# Patient Record
Sex: FEMALE | Race: WHITE | Employment: OTHER | URBAN - METROPOLITAN AREA
[De-identification: names, ages, dates, MRNs, and addresses within clinical notes are randomized per-mention and may not be internally consistent; named-entity substitution may affect disease eponyms.]

---

## 2017-04-06 ENCOUNTER — HOSPITAL ENCOUNTER (EMERGENCY)
Age: 72
Discharge: HOME | End: 2017-04-06
Attending: EMERGENCY MEDICINE | Admitting: EMERGENCY MEDICINE
Payer: MEDICARE

## 2017-04-06 ENCOUNTER — APPOINTMENT (OUTPATIENT)
Dept: GENERAL RADIOLOGY | Age: 72
End: 2017-04-06
Attending: PHYSICIAN ASSISTANT
Payer: MEDICARE

## 2017-04-06 VITALS
DIASTOLIC BLOOD PRESSURE: 60 MMHG | OXYGEN SATURATION: 98 % | TEMPERATURE: 98 F | RESPIRATION RATE: 18 BRPM | HEIGHT: 66 IN | HEART RATE: 69 BPM | BODY MASS INDEX: 33.75 KG/M2 | WEIGHT: 210 LBS | SYSTOLIC BLOOD PRESSURE: 127 MMHG

## 2017-04-06 DIAGNOSIS — S80.00XA CONTUSION OF KNEE, UNSPECIFIED LATERALITY, INITIAL ENCOUNTER: Primary | ICD-10-CM

## 2017-04-06 PROCEDURE — 74011250637 HC RX REV CODE- 250/637: Performed by: PHYSICIAN ASSISTANT

## 2017-04-06 PROCEDURE — 73562 X-RAY EXAM OF KNEE 3: CPT

## 2017-04-06 PROCEDURE — 99283 EMERGENCY DEPT VISIT LOW MDM: CPT

## 2017-04-06 RX ORDER — HYDROCODONE BITARTRATE AND ACETAMINOPHEN 7.5; 325 MG/15ML; MG/15ML
2.5 SOLUTION ORAL ONCE
Status: COMPLETED | OUTPATIENT
Start: 2017-04-06 | End: 2017-04-06

## 2017-04-06 RX ORDER — MELOXICAM 7.5 MG/1
7.5 TABLET ORAL DAILY
COMMUNITY

## 2017-04-06 RX ORDER — HYDROCODONE BITARTRATE AND ACETAMINOPHEN 5; 325 MG/1; MG/1
1 TABLET ORAL
Qty: 8 TAB | Refills: 0 | Status: ON HOLD | OUTPATIENT
Start: 2017-04-06 | End: 2018-01-30

## 2017-04-06 RX ADMIN — HYDROCODONE BITARTRATE AND ACETAMINOPHEN 2.5 MG: 2.5; 108 SOLUTION ORAL at 14:08

## 2017-04-06 RX ADMIN — HYDROCODONE BITARTRATE AND ACETAMINOPHEN 2.5 MG: 2.5; 108 SOLUTION ORAL at 15:01

## 2017-04-06 NOTE — ED NOTES
The pt is provided with a walker and instructed on it's use. The pt verbalizes and demonstrates proper use of the walker. The pt is given discharge instructions with one prescription by [facility].

## 2017-04-06 NOTE — DISCHARGE INSTRUCTIONS
We hope that we have addressed all of your medical concerns. The examination and treatment you received in the Emergency Department were for an emergent problem and were not intended as complete care. It is important that you follow up with your healthcare provider(s) for ongoing care. If your symptoms worsen or do not improve as expected, and you are unable to reach your usual health care provider(s), you should return to the Emergency Department. Today's healthcare is undergoing tremendous change, and patient satisfaction surveys are one of the many tools to assess the quality of medical care. You may receive a survey from the [employer] regarding your experience in the Emergency Department. I hope that your experience has been completely positive, particularly the medical care that I provided. As such, please participate in the survey; anything less than excellent does not meet my expectations or intentions. [facility] and [facility] participate in nationally recognized quality of care measures. If your blood pressure is greater than 120/80, as reported below, we urge that you seek medical care to address the potential of high blood pressure, commonly known as hypertension. Hypertension can be hereditary or can be caused by certain medical conditions, pain, stress, or \"white coat syndrome. \"       Please make an appointment with your health care provider(s) for follow up of your Emergency Department visit. VITALS:   Patient Vitals for the past 8 hrs:   Temp Pulse Resp BP SpO2   04/06/17 1443 - 69 18 127/60 98 %   04/06/17 1342 98 °F (36.7 °C) 73 20 131/61 97 %          Thank you for allowing us to provide you with medical care today. We realize that you have many choices for your emergency care needs. Please choose us in the future for any continued health care needs. [facility], [address] [facility] Emergency [place]: [phone]            No results found for this or any previous visit (from the past 24 hour(s)). Xr Knee Rt 3 V    Result Date: 4/6/2017  INDICATION: tib plateau v patella fx Exam: AP, lateral, oblique views of the right knee. FINDINGS: There is no acute fracture-dislocation. There is mild narrowing of the medial and lateral tibia-femoral joint spaces. There are tricompartmental osteophytes. The osseous structures are diffusely demineralized. IMPRESSION: No acute fracture or dislocation. Contusion: Care Instructions  Your Care Instructions  Contusion is the medical term for a bruise. It is the result of a direct blow or an impact, such as a fall. Contusions are common sports injuries. Most people think of a bruise as a black-and-blue spot. This happens when small blood vessels get torn and leak blood under the skin. But bones, muscles, and organs can also get bruised. This may damage deep tissues but not cause a bruise you can see. The doctor will do a physical exam to find the location of your contusion. You may also have tests to make sure you do not have a more serious injury, such as a broken bone or nerve damage. These may include X-rays or other imaging tests like a CT scan or MRI. Deep-tissue contusions may cause pain and swelling. But if there is no serious damage, they will often get better in a few weeks with home treatment. The doctor has checked you carefully, but problems can develop later. If you notice any problems or new symptoms, get medical treatment right away. Follow-up care is a key part of your treatment and safety. Be sure to make and go to all appointments, and call your doctor if you are having problems. It's also a good idea to know your test results and keep a list of the medicines you take. How can you care for yourself at home? · Put ice or a cold pack on the sore area for 10 to 20 minutes at a time to stop swelling.  Put a thin cloth between the ice pack and your skin. · Be safe with medicines. Read and follow all instructions on the label. ¨ If the doctor gave you a prescription medicine for pain, take it as prescribed. ¨ If you are not taking a prescription pain medicine, ask your doctor if you can take an over-the-counter medicine. · If you can, prop up the sore area on pillows as much as possible for the next few days. Try to keep the sore area above the level of your heart. When should you call for help? Call your doctor now or seek immediate medical care if:  · Your pain gets worse. · You have new or worse swelling. · You have tingling, weakness, or numbness in the area near the contusion. · The area near the contusion is cold or pale. Watch closely for changes in your health, and be sure to contact your doctor if:  · You do not get better as expected. Where can you learn more? Go to [employer]  Enter [id number] in the search box to learn more about \"Contusion: Care Instructions. \"   © 8336-2794 Healthwise, Incorporated. Care instructions adapted under license by [employer] (which disclaims liability or warranty for this information). This care instruction is for use with your licensed healthcare professional. If you have questions about a medical condition or this instruction, always ask your healthcare professional. [address] any warranty or liability for your use of this information.   Content Version: 85.2.810773; Current as of: May 22, 2015

## 2017-04-06 NOTE — ED TRIAGE NOTES
Pt was wheeled to the treatment area. Pt states \"I tripped and fell outside this morning about 745 am landing on my right knee. Now I can not bear any weight without pain and it seems to be getting swollen. \"

## 2017-04-06 NOTE — ED PROVIDER NOTES
HPI Comments: 72 y/o F presents following slip and fall landing on R knee, just pta, pain 10/10, difficulty walking due to pain, drove self here, no hip or upper/lower leg pain, no head injury denies syncope. Patient notes pain only at knee. Able to flex/straighten knee without limitation. Patient is a 73 y.o. female presenting with knee pain. Knee Pain           Past Medical History:   Diagnosis Date    Cardiac rhythm disorder or disturbance or change     Left upper quadrant pain 2013    Mitral valve prolapse        Past Surgical History:   Procedure Laterality Date    HX GASTRIC BYPASS  7585,0841    HX HEART CATHETERIZATION      HX PARTIAL HYSTERECTOMY           Family History:   Problem Relation Age of Onset    Cancer Mother     Heart Disease Mother     Hypertension Mother     Cancer Father      leukemia    Cancer Sister      -breast    Thyroid Disease Sister      precancer       Social History     Social History    Marital status:      Spouse name: N/A    Number of children: N/A    Years of education: N/A     Occupational History    Not on file. Social History Main Topics    Smoking status: Former Smoker    Smokeless tobacco: Never Used    Alcohol use Yes      Comment: occassional    Drug use: No    Sexual activity: Not on file     Other Topics Concern    Not on file     Social History Narrative         ALLERGIES: Azithromycin; Codeine; and Morphine    Review of Systems   Musculoskeletal: Positive for gait problem. All other systems reviewed and are negative. Vitals:    17 1342 17 1443   BP: 131/61 127/60   Pulse: 73 69   Resp: 20 18   Temp: 98 °F (36.7 °C)    SpO2: 97% 98%   Weight: 95.3 kg (210 lb)    Height: 5' 5.5\" (1.664 m)             Physical Exam   Constitutional: She is oriented to person, place, and time. She appears well-developed and well-nourished. HENT:   Head: Normocephalic and atraumatic.    Eyes: Conjunctivae and EOM are normal. Pupils are equal, round, and reactive to light. Neck: Normal range of motion. Neck supple. Cardiovascular: Normal rate and normal heart sounds. Pulmonary/Chest: Effort normal and breath sounds normal.   Abdominal: Soft. Bowel sounds are normal.   Musculoskeletal:        Right hip: Normal.        Right knee: She exhibits bony tenderness. She exhibits normal range of motion, no swelling, no deformity and normal patellar mobility. Tenderness found. Medial joint line and lateral joint line tenderness noted. Right upper leg: Normal.        Right foot: Normal.   Neurological: She is alert and oriented to person, place, and time. Skin: Skin is warm and dry. Nursing note and vitals reviewed. MDM  Number of Diagnoses or Management Options  Contusion of knee, unspecified laterality, initial encounter:   Diagnosis management comments: R knee pain, nonspecific, able to bear weight, patient has a walker with her, ambulating safely, will provide a couple of days of analgesics with f/u prn. Do not suspect occult fx or ligamentous injury.      ED Course       Procedures

## 2017-05-30 ENCOUNTER — HOSPITAL ENCOUNTER (OUTPATIENT)
Dept: GENERAL RADIOLOGY | Age: 72
Discharge: HOME | End: 2017-05-30
Attending: FAMILY MEDICINE
Payer: MEDICARE

## 2017-05-30 ENCOUNTER — HOSPITAL ENCOUNTER (OUTPATIENT)
Dept: MAMMOGRAPHY | Age: 72
Discharge: HOME | End: 2017-05-30
Attending: FAMILY MEDICINE
Payer: MEDICARE

## 2017-05-30 DIAGNOSIS — M54.2 CERVICALGIA: ICD-10-CM

## 2017-05-30 DIAGNOSIS — Z78.0 POSTMENOPAUSAL: ICD-10-CM

## 2017-05-30 DIAGNOSIS — R20.2 PARESTHESIA: ICD-10-CM

## 2017-05-30 PROCEDURE — 77080 DXA BONE DENSITY AXIAL: CPT

## 2017-05-30 PROCEDURE — 72100 X-RAY EXAM L-S SPINE 2/3 VWS: CPT

## 2017-05-30 PROCEDURE — 72050 X-RAY EXAM NECK SPINE 4/5VWS: CPT

## 2017-06-01 ENCOUNTER — HOSPITAL ENCOUNTER (OUTPATIENT)
Dept: VASCULAR SURGERY | Age: 72
Discharge: HOME | End: 2017-06-01
Attending: FAMILY MEDICINE
Payer: MEDICARE

## 2017-06-01 ENCOUNTER — HOSPITAL ENCOUNTER (OUTPATIENT)
Dept: MAMMOGRAPHY | Age: 72
Discharge: HOME | End: 2017-06-01
Attending: FAMILY MEDICINE
Payer: MEDICARE

## 2017-06-01 DIAGNOSIS — R20.2 PARESTHESIA OF BOTH LEGS: ICD-10-CM

## 2017-06-01 DIAGNOSIS — Z12.31 VISIT FOR SCREENING MAMMOGRAM: ICD-10-CM

## 2017-06-01 PROCEDURE — 93970 EXTREMITY STUDY: CPT

## 2017-06-01 PROCEDURE — 77067 SCR MAMMO BI INCL CAD: CPT

## 2017-06-09 ENCOUNTER — HOSPITAL ENCOUNTER (OUTPATIENT)
Dept: MRI IMAGING | Age: 72
Discharge: HOME | End: 2017-06-09
Attending: ORTHOPAEDIC SURGERY
Payer: MEDICARE

## 2017-06-09 DIAGNOSIS — M25.561 RIGHT KNEE PAIN: ICD-10-CM

## 2017-06-09 PROCEDURE — 73721 MRI JNT OF LWR EXTRE W/O DYE: CPT

## 2017-10-02 ENCOUNTER — HOSPITAL ENCOUNTER (OUTPATIENT)
Dept: CT IMAGING | Age: 72
Discharge: HOME | End: 2017-10-02
Attending: FAMILY MEDICINE
Payer: MEDICARE

## 2017-10-02 DIAGNOSIS — S09.90XA: ICD-10-CM

## 2017-10-02 PROCEDURE — 70450 CT HEAD/BRAIN W/O DYE: CPT

## 2018-01-29 NOTE — H&P
81 y.o. female for open access colonoscopy for screening   Additional data for completion of the targeted pre-endoscopy H&P will be provided under 'H&P interval notes'. Please see that document which will be attached to this.   Marcin Tinsley MD  Last colon TJS - hx polyps 2012

## 2018-01-30 ENCOUNTER — ANESTHESIA EVENT (OUTPATIENT)
Dept: ENDOSCOPY | Age: 73
End: 2018-01-30
Payer: MEDICARE

## 2018-01-30 ENCOUNTER — HOSPITAL ENCOUNTER (OUTPATIENT)
Age: 73
Setting detail: OUTPATIENT SURGERY
Discharge: HOME | End: 2018-01-30
Attending: SPECIALIST | Admitting: SPECIALIST
Payer: MEDICARE

## 2018-01-30 ENCOUNTER — ANESTHESIA (OUTPATIENT)
Dept: ENDOSCOPY | Age: 73
End: 2018-01-30
Payer: MEDICARE

## 2018-01-30 VITALS
HEART RATE: 84 BPM | TEMPERATURE: 98.7 F | OXYGEN SATURATION: 99 % | SYSTOLIC BLOOD PRESSURE: 130 MMHG | DIASTOLIC BLOOD PRESSURE: 67 MMHG | BODY MASS INDEX: 33.75 KG/M2 | HEIGHT: 66 IN | RESPIRATION RATE: 20 BRPM | WEIGHT: 210 LBS

## 2018-01-30 PROCEDURE — 76040000019: Performed by: SPECIALIST

## 2018-01-30 PROCEDURE — 88305 TISSUE EXAM BY PATHOLOGIST: CPT | Performed by: SPECIALIST

## 2018-01-30 PROCEDURE — 77030013992 HC SNR POLYP ENDOSC BSC -B: Performed by: SPECIALIST

## 2018-01-30 PROCEDURE — 74011250636 HC RX REV CODE- 250/636: Performed by: SPECIALIST

## 2018-01-30 RX ORDER — ALBUTEROL SULFATE 90 UG/1
2 AEROSOL, METERED RESPIRATORY (INHALATION)
COMMUNITY

## 2018-01-30 RX ORDER — DEXTROMETHORPHAN/PSEUDOEPHED 2.5-7.5/.8
1.2 DROPS ORAL
Status: DISCONTINUED | OUTPATIENT
Start: 2018-01-30 | End: 2018-01-30 | Stop reason: HOSPADM

## 2018-01-30 RX ORDER — NALOXONE HYDROCHLORIDE 0.4 MG/ML
0.4 INJECTION, SOLUTION INTRAMUSCULAR; INTRAVENOUS; SUBCUTANEOUS
Status: DISCONTINUED | OUTPATIENT
Start: 2018-01-30 | End: 2018-01-30 | Stop reason: HOSPADM

## 2018-01-30 RX ORDER — FENTANYL CITRATE 50 UG/ML
25 INJECTION, SOLUTION INTRAMUSCULAR; INTRAVENOUS AS NEEDED
Status: DISCONTINUED | OUTPATIENT
Start: 2018-01-30 | End: 2018-01-30 | Stop reason: HOSPADM

## 2018-01-30 RX ORDER — FLUMAZENIL 0.1 MG/ML
0.2 INJECTION INTRAVENOUS
Status: DISCONTINUED | OUTPATIENT
Start: 2018-01-30 | End: 2018-01-30 | Stop reason: HOSPADM

## 2018-01-30 RX ORDER — SODIUM CHLORIDE 9 MG/ML
50 INJECTION, SOLUTION INTRAVENOUS CONTINUOUS
Status: DISCONTINUED | OUTPATIENT
Start: 2018-01-30 | End: 2018-01-30 | Stop reason: HOSPADM

## 2018-01-30 RX ORDER — MIDAZOLAM HYDROCHLORIDE 1 MG/ML
.25-5 INJECTION, SOLUTION INTRAMUSCULAR; INTRAVENOUS AS NEEDED
Status: DISCONTINUED | OUTPATIENT
Start: 2018-01-30 | End: 2018-01-30 | Stop reason: HOSPADM

## 2018-01-30 RX ORDER — PREDNISONE 10 MG/1
10 TABLET ORAL SEE ADMIN INSTRUCTIONS
COMMUNITY
End: 2018-04-03 | Stop reason: CLARIF

## 2018-01-30 RX ADMIN — FENTANYL CITRATE 50 MCG: 50 INJECTION, SOLUTION INTRAMUSCULAR; INTRAVENOUS at 10:07

## 2018-01-30 RX ADMIN — SODIUM CHLORIDE 50 ML/HR: 900 INJECTION, SOLUTION INTRAVENOUS at 09:35

## 2018-01-30 RX ADMIN — MIDAZOLAM HYDROCHLORIDE 2 MG: 1 INJECTION, SOLUTION INTRAMUSCULAR; INTRAVENOUS at 10:07

## 2018-01-30 NOTE — PERIOP NOTES
Patient removed all her jewelry and placed in her purse. Which was then zipped closed and placed in patient belonging bag.

## 2018-01-30 NOTE — PROCEDURES
[address] [facility] GOKUL Lopez MD  [phone]      2018    Colonoscopy Procedure Note  Lv Burt  :  1945  Ana Medical Record Number: [id number]    Indications:     Personal history of colon polyps (screening only), Screening colonoscopy  PCP:  Osorio Nam MD  Anesthesia/Sedation: Conscious Sedation/Moderate Sedation  Endoscopist:  Dr. Estephania Alfaro  Complications:  None  Estimated Blood Loss:  None    Permit:  The indications, risks, benefits and alternatives were reviewed with the patient or their decision maker who was provided an opportunity to ask questions and all questions were answered. The specific risks of colonoscopy with conscious sedation were reviewed, including but not limited to anesthetic complication, bleeding, adverse drug reaction, missed lesion, infection, IV site reactions, and intestinal perforation which would lead to the need for surgical repair. Alternatives to colonoscopy including radiographic imaging, observation without testing, or laboratory testing were reviewed including the limitations of those alternatives. After considering the options and having all their questions answered, the patient or their decision maker provided both verbal and written consent to proceed. Procedure in Detail:  After obtaining informed consent, positioning of the patient in the left lateral decubitus position, and conduction of a pre-procedure pause or \"time out\" the endoscope was introduced into the anus and advanced to the cecum, which was identified by the ileocecal valve and appendiceal orifice. The quality of the colonic preparation was good. A careful inspection was made as the colonoscope was withdrawn, findings and interventions are described below.     Findings:   Ascending polyp 5mm  Transverse polyp 5mm  Sigmoid polyps 4mm 6mm  All taken cold snare, submitted in 3 vials based on location, complete resection, retrieval and hemostasis noted. Specimens:    See above    Complications:   None; patient tolerated the procedure well. Impression:  Colon polyps four. Recommendations:     - Await pathology. Thank you for entrusting me with this patient's care. Please do not hesitate to contact me with any questions or if I can be of assistance with any of your other patients' GI needs.     Signed By: Ray Encarnacion MD                        January 30, 2018

## 2018-01-30 NOTE — ANESTHESIA PREPROCEDURE EVALUATION
Anesthetic History               Review of Systems / Medical History  Patient summary reviewed and nursing notes reviewed    Pulmonary                   Neuro/Psych   Within defined limits           Cardiovascular    Hypertension: well controlled              Exercise tolerance: >4 METS  Comments: MVP   GI/Hepatic/Renal                Endo/Other        Obesity     Other Findings              Physical Exam    Airway  Mallampati: III    Neck ROM: normal range of motion   Mouth opening: Normal     Cardiovascular    Rhythm: regular  Rate: normal         Dental    Dentition: Caps/crowns, Full upper dentures and Lower partial plate     Pulmonary  Breath sounds clear to auscultation               Abdominal         Other Findings            Anesthetic Plan    ASA: 2  Anesthesia type: MAC            Anesthetic plan and risks discussed with: Patient      Informed consent obtained.

## 2018-01-30 NOTE — DISCHARGE INSTRUCTIONS
[address] [facility] GOKUL Daly MD  [phone]      January 30, 2018    Alejandro Graham  Birthdate: [birth year]    COLONOSCOPY DISCHARGE INSTRUCTIONS    If there is redness at IV site you should apply warm compress to area. If redness or soreness persist contact Dr. Priti Daly' or your primary care doctor. There may be a slight amount of blood passed from the rectum. Gaseous discomfort may develop, but walking, belching will help relieve this. You may not operate a vehicle for 12 hours  You may not operate machinery or dangerous appliances for rest of today  You may not drink alcoholic beverages for 12 hours  Avoid making any critical decisions for 24 hours    DIET:  You may resume your normal diet, but some patients find that heavy or large meals may lead to indigestion or vomiting. I suggest a light meal as first food intake. MEDICATIONS:  The use of some over-the-counter pain medication may lead to bleeding after colon biopsies or polyp removal.  Tylenol (also called acetaminophen) is safe to take even if you have had colonoscopy with polyp removal.  Based on the procedure you had today you may not safely take aspirin or aspirin-like products for the next ten (10) days. Remember that Tylenol (also called acetaminophen) is safe to take after colonoscopy even if you have had biopsies or polyps removed. ACTIVITY:  You may resume your normal household activities, but it is recommended that you spend the remainder of the day resting -  avoid any strenuous activity.     CALL DR. Melodie Schreiber' OFFICE IF:  Increasing pain, nausea, vomiting  Abdominal distension (swelling)  Significant new or increased bleeding (oral or rectal)  Fever/Chills  Chest pain/shortness of breath                       Additional instructions:   No aspirin 10 days  We found several polyps and removed them all  I will contact you with the polyp results by letter in about a week and give advice about when to have next colonoscopy based on those results. It was an honor to be your doctor today. Please let me or my office staff know if you have any feedback about today's procedure. Bay Layne MD    Colonoscopy saves lives, and can prevent colon cancer. Everyone aged 48 or older needs colonoscopy.   Tell your family and friends: get the test!

## 2018-01-30 NOTE — IP AVS SNAPSHOT
Summary of Care Report The Summary of Care report has been created to help improve care coordination. Users with access to [employer] or [address] (Web-based application) may access additional patient information including the Discharge Summary. If you are not currently a [address] user and need more information, please call the number listed below in the [place] section and ask to be connected with Medical Records. Facility Information Name Address Phone [address] [facility] [place] [facility] [address] [phone] Patient Information Patient Name Sex  Tina Yen ([id number]) Female 1945 Discharge Information Admitting Provider Service Area Unit Estefania Christianson MD / [id number] / [phone] Discharge Provider Discharge Date/Time Discharge Disposition Destination (none) (none) (none) (none) Patient Language Language ENGLISH [13] Hospital Problems as of 2018  Reviewed: 2016  9:26 PM by Jose Crews MD  
 None Non-Hospital Problems as of 2018  Reviewed: 2016  9:26 PM by Jose Crews MD  
  
  
  
 Class Noted - Resolved Last Modified Active Problems Left upper quadrant pain  2013 - Present 2013 by Simuel Schilder, MD  
  Entered by Simuel Schilder, MD  
  Diarrhea  2013 - Present 2013 by Simuel Schilder, MD  
  Entered by Simuel Schilder, MD  
  Weight loss  2013 - Present 2013 by Simuel Schilder, MD  
  Entered by Simuel Schilder, MD  
  Lytic lesion of bone on x-ray  2016 - Present 2016 by Karoline Mathew NP Entered by Karoline Mathew NP Pathologic fracture of sternum  2016 - Present 2016 by Karoline Mathew NP   Entered by Karoline Mathew NP  
  
 You are allergic to the following Allergen Reactions Azithromycin Other (comments) Makes pt feel worse, run fever, get shaky. Codeine Itching Morphine Itching Current Discharge Medication List  
  
CONTINUE these medications which have NOT CHANGED Dose & Instructions Dispensing Information Comments  
 albuterol 90 mcg/actuation inhaler Commonly known as:  PROVENTIL HFA, VENTOLIN HFA, PROAIR HFA Dose:  2 Puff Take 2 Puffs by inhalation every six (6) hours as needed for Wheezing. Refills:  0  
   
 meloxicam 7.5 mg tablet Commonly known as:  MOBIC Dose:  7.5 mg Take 7.5 mg by mouth daily. Refills:  0  
   
 metoprolol tartrate 50 mg tablet Commonly known as:  LOPRESSOR Dose:  50 mg Take 50 mg by mouth daily. Refills:  0  
   
 predniSONE 10 mg dose pack Commonly known as:  STERAPRED DS Dose:  10 mg Take 10 mg by mouth See Admin Instructions. See administration instruction per 10mg dose pack Refills:  0  
   
 triamterene-hydroCHLOROthiazide 37.5-25 mg per capsule Commonly known as:  Deborra [place] Take  by mouth daily. Refills:  0 Surgery Information ID Date/Time Status Primary Surgeon All Procedures Location [id number] 1/30/2018 [address] [place] [address] MD Ananth COLONOSCOPY 
ENDOSCOPIC POLYPECTOMY [facility] ENDOSCOPY Follow-up Information None Discharge Instructions [address] Jennifer Gaston. Evonnie Denver, [address] [place] [address] 
[phone] January 30, 2018 [facility] YOB: 1945 COLONOSCOPY DISCHARGE INSTRUCTIONS If there is redness at IV site you should apply warm compress to area. If redness or soreness persist contact Dr. Evonnie Denver' or your primary care doctor. There may be a slight amount of blood passed from the rectum. Gaseous discomfort may develop, but walking, belching will help relieve this. You may not operate a vehicle for 12 hours You may not operate machinery or dangerous appliances for rest of today You may not drink alcoholic beverages for 12 hours Avoid making any critical decisions for 24 hours DIET: 
You may resume your normal diet, but some patients find that heavy or large meals may lead to indigestion or vomiting. I suggest a light meal as first food intake. MEDICATIONS: 
The use of some over-the-counter pain medication may lead to bleeding after colon biopsies or polyp removal.  Tylenol (also called acetaminophen) is safe to take even if you have had colonoscopy with polyp removal.  Based on the procedure you had today you may not safely take aspirin or aspirin-like products for the next ten (10) days. Remember that Tylenol (also called acetaminophen) is safe to take after colonoscopy even if you have had biopsies or polyps removed. ACTIVITY: 
You may resume your normal household activities, but it is recommended that you spend the remainder of the day resting -  avoid any strenuous activity. CALL DR. Isaura Sheehan' OFFICE IF: Increasing pain, nausea, vomiting Abdominal distension (swelling) Significant new or increased bleeding (oral or rectal) Fever/Chills Chest pain/shortness of breath Additional instructions:  
No aspirin 10 days We found several polyps and removed them all I will contact you with the polyp results by letter in about a week and give advice about when to have next colonoscopy based on those results. It was an honor to be your doctor today. Please let me or my office staff know if you have any feedback about today's procedure. Joylene Holstein, MD 
 
Colonoscopy saves lives, and can prevent colon cancer. Everyone aged 48 or older needs colonoscopy. Tell your family and friends: get the test! 
 
 
 
 
Chart Review Routing History No Routing History on File

## 2018-01-30 NOTE — IP AVS SNAPSHOT
[address] [place] [address] [id number] [facility] 
[phone] Patient: Kimberly Martinez MRN: [id number] About your hospitalization You were admitted on:  January 30, 2018 You last received care in the:  [facility] ENDOSCOPY You were discharged on:  January 30, 2018 Why you were hospitalized Your primary diagnosis was:  Not on File Follow-up Information None Discharge Orders None A check rae indicates which time of day the medication should be taken. My Medications CONTINUE taking these medications Instructions Each Dose to Equal  
 Morning Noon Evening Bedtime  
 albuterol 90 mcg/actuation inhaler Commonly known as:  PROVENTIL HFA, VENTOLIN HFA, PROAIR HFA Your last dose was: Your next dose is: Take 2 Puffs by inhalation every six (6) hours as needed for Wheezing. 2 Puff  
    
   
   
   
  
 meloxicam 7.5 mg tablet Commonly known as:  MOBIC Your last dose was: Your next dose is: Take 7.5 mg by mouth daily. 7.5 mg  
    
   
   
   
  
 metoprolol tartrate 50 mg tablet Commonly known as:  LOPRESSOR Your last dose was: Your next dose is: Take 50 mg by mouth daily. 50 mg  
    
   
   
   
  
 predniSONE 10 mg dose pack Commonly known as:  STERAPRED DS Your last dose was: Your next dose is: Take 10 mg by mouth See Admin Instructions. See administration instruction per 10mg dose pack 10 mg  
    
   
   
   
  
 triamterene-hydroCHLOROthiazide 37.5-25 mg per capsule Commonly known as:  Collette Dais Your last dose was: Your next dose is: Take  by mouth daily. Discharge Instructions [address] [place]  Paresh Lynch MD 
 [phone] January 30, 2018 Maryana Lundberg YOB: 1945 COLONOSCOPY DISCHARGE INSTRUCTIONS If there is redness at IV site you should apply warm compress to area. If redness or soreness persist contact Dr. Ashleigh Mccullough or your primary care doctor. There may be a slight amount of blood passed from the rectum. Gaseous discomfort may develop, but walking, belching will help relieve this. You may not operate a vehicle for 12 hours You may not operate machinery or dangerous appliances for rest of today You may not drink alcoholic beverages for 12 hours Avoid making any critical decisions for 24 hours DIET: 
You may resume your normal diet, but some patients find that heavy or large meals may lead to indigestion or vomiting. I suggest a light meal as first food intake. MEDICATIONS: 
The use of some over-the-counter pain medication may lead to bleeding after colon biopsies or polyp removal.  Tylenol (also called acetaminophen) is safe to take even if you have had colonoscopy with polyp removal.  Based on the procedure you had today you may not safely take aspirin or aspirin-like products for the next ten (10) days. Remember that Tylenol (also called acetaminophen) is safe to take after colonoscopy even if you have had biopsies or polyps removed. ACTIVITY: 
You may resume your normal household activities, but it is recommended that you spend the remainder of the day resting -  avoid any strenuous activity. CALL DR. Eduarda Orta' OFFICE IF: Increasing pain, nausea, vomiting Abdominal distension (swelling) Significant new or increased bleeding (oral or rectal) Fever/Chills Chest pain/shortness of breath Additional instructions:  
No aspirin 10 days We found several polyps and removed them all I will contact you with the polyp results by letter in about a week and give advice about when to have next colonoscopy based on those results. It was an honor to be your doctor today. Please let me or my office staff know if you have any feedback about today's procedure. Emelina Richardson MD 
 
Colonoscopy saves lives, and can prevent colon cancer. Everyone aged 48 or older needs colonoscopy. Tell your family and friends: get the test! 
 
 
 
 
  
  
  
Introducing [employer] [facility] introduces [facility] patient portal. Now you can access parts of your medical record, email your doctor's office, and request medication refills online. 1. In your internet browser, go to [email or web address] [email or web address] 2. Click on the First Time User? Click Here link in the Sign In box. You will see the New Member Sign Up page. 3. Enter your [facility] [employer] [facility] Access Code exactly as it appears below. You will not need to use this code after youve completed the sign-up process. If you do not sign up before the expiration date, you must request a new code. · [facility] [employer] Access Code: [email or web address] Expires: 4/30/2018  8:39 AM 
 
4. Enter the last four digits of your Social Security Number (xxxx) and Date of Birth (mm/dd/yyyy) as indicated and click Submit. You will be taken to the next sign-up page. 5. Create a [facility] [employer] [facility] ID. This will be your [facility] [employer] [facility] login ID and cannot be changed, so think of one that is secure and easy to remember. 6. Create a [facility] [employer] password. You can change your password at any time. 7. Enter your Password Reset Question and Answer. This can be used at a later time if you forget your password. 8. Enter your e-mail address. You will receive e-mail notification when new information is available in [address] 9. Click Sign Up. You can now view and download portions of your medical record. 10. Click the Download Summary menu link to download a portable copy of your medical information. If you have questions, please visit the Frequently Asked Questions section of the [employer] website.  Remember, [facility] [employer] [facility] is NOT to be used for urgent needs. For medical emergencies, dial 911. Now available from your iPhone and Android! Providers Seen During Your Hospitalization Provider Specialty Primary office phone Janina Perdomo MD Gastroenterology [phone] Your Primary Care Physician (PCP) Primary Care Physician Office Phone Office Fax OTHER, PHYS ** None ** ** None ** You are allergic to the following Allergen Reactions Azithromycin Other (comments) Makes pt feel worse, run fever, get shaky. Codeine Itching Morphine Itching Recent Documentation Height Weight Breastfeeding? BMI OB Status Smoking Status 1.664 m 95.3 kg No 34.41 kg/m2 Hysterectomy Former Smoker Emergency Contacts Name Discharge Info Relation Home Work Mobile 71 Swapna Marroquin CAREGIVER [3] Child [2] [phone] Nikos Landaverde-Eryn DISCHARGE CAREGIVER [3] Child [2] [phone] Patient Belongings The following personal items are in your possession at time of discharge: 
  Dental Appliances: Lowers, Partials, Uppers  Visual Aid: None Please provide this summary of care documentation to your next provider. Signatures-by signing, you are acknowledging that this After Visit Summary has been reviewed with you and you have received a copy. Patient Signature:  ____________________________________________________________ Date:  ____________________________________________________________  
  
Yelitza Cuadra Provider Signature:  ____________________________________________________________ Date:  ____________________________________________________________

## 2018-01-30 NOTE — PROGRESS NOTES
Daniel Counce  [birth year]  [id number]    Situation:  Verbal report received from: Sal Sandoval RN  Procedure: Procedure(s):  COLONOSCOPY  ENDOSCOPIC POLYPECTOMY    Background:    Preoperative diagnosis: PERS HX COLONIC POLYPS  Postoperative diagnosis: ascending colon polyp, transverse colon polyp, diverticulosis, sigmoid colon polyp    :  Dr. Bañuelos Record  Assistant(s): Endoscopy Technician-1: Janina St  Endoscopy RN-1: Rosanna Schofield RN    Specimens:   ID Type Source Tests Collected by Time Destination   1 : ascending colon polyp Preservative Colon, Ascending  Alexi Dubois MD 1/30/2018 1014 Pathology   2 : transverse colon polyp Preservative Colon, Transverse  Alexi Dubois MD 1/30/2018 1015 Pathology   3 : sigmoid colon polyps Preservative Sigmoid  Alexi Dubois MD 1/30/2018 1019 Pathology     H. Pylori  no    Assessment:  Intra-procedure medications   Versed 2 mg  Fentanyl 50 mcg  Anesthesia gave intra-procedure sedation and medications, see anesthesia flow sheet no    Intravenous fluids: NS@ KVO     Vital signs stable yes    Abdominal assessment: round and soft yes    Recommendation:  Discharge patient per MD order yes.   Return to floor outpatient  Family or Friend family  Permission to share finding with family or friend yes

## 2018-01-30 NOTE — INTERVAL H&P NOTE
Pre-Endoscopy H&P Update  Chief complaint/HPI/ROS:  The indication for the procedure, the patient's history and the patient's current medications are reviewed prior to the procedure and that data is reported on the H&P to which this document is attached. Any significant complaints with regard to organ systems will be noted, and if not mentioned then a review of systems is not contributory. Past Medical History:   Diagnosis Date    Asthma     Cardiac rhythm disorder or disturbance or change     MVP and heart murmur    Hypertension     Left upper quadrant pain 2013    Mitral valve prolapse       Past Surgical History:   Procedure Laterality Date    HX GASTRIC BYPASS  [id number]    HX GYN      uterine fibroids removed before hysterectomy    HX HEART CATHETERIZATION      negative    HX HYSTERECTOMY      HX OTHER SURGICAL      left middle finger cut nerve and repaired    HX PARTIAL HYSTERECTOMY       Social   Social History   Substance Use Topics    Smoking status: Former Smoker     Packs/day: 1.00     Quit date: 1987    Smokeless tobacco: Never Used    Alcohol use Yes      Comment: occassional      Family History   Problem Relation Age of Onset    Cancer Mother     Heart Disease Mother     Hypertension Mother     Breast Cancer Mother     Cancer Father      leukemia    Cancer Sister      -breast    Breast Cancer Sister     Thyroid Disease Sister      precancer      Allergies   Allergen Reactions    Azithromycin Other (comments)     Makes pt feel worse, run fever, get shaky.  Codeine Itching    Morphine Itching      Prior to Admission Medications   Prescriptions Last Dose Informant Patient Reported? Taking? albuterol (PROVENTIL HFA, VENTOLIN HFA, PROAIR HFA) 90 mcg/actuation inhaler 2017 at Unknown time  Yes Yes   Sig: Take 2 Puffs by inhalation every six (6) hours as needed for Wheezing.    meloxicam (MOBIC) 7.5 mg tablet 2018 at Unknown time  Yes Yes Sig: Take 7.5 mg by mouth daily. metoprolol (LOPRESSOR) 50 mg tablet 1/28/2018 at am  Yes Yes   Sig: Take 50 mg by mouth daily. predniSONE (STERAPRED DS) 10 mg dose pack 1/29/2018 at pm  Yes Yes   Sig: Take 10 mg by mouth See Admin Instructions. See administration instruction per 10mg dose pack   triamterene-hydrochlorothiazide (DYAZIDE) 37.5-25 mg per capsule 1/28/2018 at am  Yes No   Sig: Take  by mouth daily. Facility-Administered Medications: None       PHYSICAL EXAM:  The patient is examined immediately prior to the procedure. Visit Vitals    /71    Pulse 76    Temp 98.3 °F (36.8 °C)    Resp 17    Ht 5' 5.5\" (1.664 m)    Wt 95.3 kg (210 lb)    SpO2 98%    Breastfeeding No    BMI 34.41 kg/m2     Gen: Appears comfortable, no distress. Pulm: comfortable respirations with no abnormal audible breath sounds  CV: heart regular, well perfused  GI: abdomen flat. PLAN:  Informed consent discussion held, patient afforded an opportunity to ask questions and all questions answered. After being advised of the risks, benefits, and alternatives, the patient requested that we proceed and indicated so on a written consent form. Will proceed with procedure as planned.   Michelle Adams MD

## 2018-01-30 NOTE — PERIOP NOTES
Patient tolerated procedure without problems. Abdomen soft and non tender to palpation. Patient arouseable and voices no complaints of pain. Vital signs stable.  Transported to endo post via stretcher and bedside verbal report given to [address] [place] [address]

## 2018-02-10 ENCOUNTER — HOSPITAL ENCOUNTER (EMERGENCY)
Age: 73
Discharge: HOME | End: 2018-02-10
Attending: EMERGENCY MEDICINE
Payer: MEDICARE

## 2018-02-10 VITALS
SYSTOLIC BLOOD PRESSURE: 141 MMHG | DIASTOLIC BLOOD PRESSURE: 68 MMHG | TEMPERATURE: 98 F | HEART RATE: 81 BPM | HEIGHT: 65 IN | OXYGEN SATURATION: 97 % | BODY MASS INDEX: 34.99 KG/M2 | WEIGHT: 210 LBS | RESPIRATION RATE: 20 BRPM

## 2018-02-10 DIAGNOSIS — S91.312A FOOT LACERATION, LEFT, INITIAL ENCOUNTER: ICD-10-CM

## 2018-02-10 DIAGNOSIS — S99.922A FOOT INJURY, LEFT, INITIAL ENCOUNTER: Primary | ICD-10-CM

## 2018-02-10 PROCEDURE — 99281 EMR DPT VST MAYX REQ PHY/QHP: CPT

## 2018-02-10 PROCEDURE — 74011250636 HC RX REV CODE- 250/636: Performed by: NURSE PRACTITIONER

## 2018-02-10 PROCEDURE — 90715 TDAP VACCINE 7 YRS/> IM: CPT | Performed by: NURSE PRACTITIONER

## 2018-02-10 PROCEDURE — 90471 IMMUNIZATION ADMIN: CPT

## 2018-02-10 PROCEDURE — 77030018836 HC SOL IRR NACL ICUM -A

## 2018-02-10 PROCEDURE — 75810000293 HC SIMP/SUPERF WND  RPR

## 2018-02-10 PROCEDURE — 77030010507 HC ADH SKN DERMBND J&J -B

## 2018-02-10 RX ADMIN — TETANUS TOXOID, REDUCED DIPHTHERIA TOXOID AND ACELLULAR PERTUSSIS VACCINE, ADSORBED 0.5 ML: 5; 2.5; 8; 8; 2.5 SUSPENSION INTRAMUSCULAR at 01:08

## 2018-02-10 NOTE — DISCHARGE INSTRUCTIONS
Cuts Closed With Adhesives: Care Instructions  Your Care Instructions  A cut can happen anywhere on your body. The doctor used an adhesive to close the cut. When the adhesive dries, it forms a film that holds the edges of the cut together. Skin adhesives are sometimes called liquid stitches. If the cut went deep and through the skin, the doctor may have put in a layer of stitches below the adhesive. The deeper layer of stitches brings the deep part of the cut together. These stitches will dissolve and don't need to be removed. You don't see the stitches, only the adhesive. You may have a bandage. The doctor has checked you carefully, but problems can develop later. If you notice any problems or new symptoms, get medical treatment right away. Follow-up care is a key part of your treatment and safety. Be sure to make and go to all appointments, and call your doctor if you are having problems. It's also a good idea to know your test results and keep a list of the medicines you take. How can you care for yourself at home? · Keep the cut dry for the first 24 to 48 hours. After this, you can shower if your doctor okays it. Pat the cut dry. · Don't soak the cut, such as in a bathtub. Your doctor will tell you when it's safe to get the cut wet. · If your doctor told you how to care for your cut, follow your doctor's instructions. If you did not get instructions, follow this general advice:  ¨ Do not put any kind of ointment, cream, or lotion over the area. This can make the adhesive fall off too soon. ¨ After the first 24 to 48 hours, wash around the cut with clean water 2 times a day. Do not use hydrogen peroxide or alcohol, which can slow healing. ¨ If the doctor told you to use a bandage, put on a new bandage after cleaning the cut or if the bandage gets wet or dirty. · Prop up the sore area on a pillow anytime you sit or lie down during the next 3 days. Try to keep it above the level of your heart. This will help reduce swelling. · Leave the skin adhesive on your skin until it falls off on its own. This may take 5 to 10 days. · Do not scratch, rub, or pick at the adhesive. · Do not put the sticky part of a bandage directly on the adhesive. · Avoid any activity that could cause your cut to reopen. · Be safe with medicines. Read and follow all instructions on the label. ¨ If the doctor gave you a prescription medicine for pain, take it as prescribed. ¨ If you are not taking a prescription pain medicine, ask your doctor if you can take an over-the-counter medicine. When should you call for help? Call your doctor now or seek immediate medical care if:  ? · You have new pain, or your pain gets worse. ? · The skin near the cut is cold or pale or changes color. ? · You have tingling, weakness, or numbness near the cut.   ? · The cut starts to bleed. ? · You have trouble moving the area near the cut.   ? · You have symptoms of infection, such as:  ¨ Increased pain, swelling, warmth, or redness around the cut. ¨ Red streaks leading from the cut. ¨ Pus draining from the cut. ¨ A fever. ? Watch closely for changes in your health, and be sure to contact your doctor if:  ? · The cut reopens. ? · You do not get better as expected. Where can you learn more? Go to http://hamlet-deyanira.info/. Enter P174 in the search box to learn more about \"Cuts Closed With Adhesives: Care Instructions. \"  Current as of: March 20, 2017  Content Version: 11.4  © 6953-2486 [employer]. Care instructions adapted under license by [employer] (which disclaims liability or warranty for this information). If you have questions about a medical condition or this instruction, always ask your healthcare professional. [address] any warranty or liability for your use of this information.          Cuts Left Open: Care Instructions  Your Care Instructions    A cut can happen anywhere on your body. Sometimes a cut can injure the tendons, blood vessels, or nerves. A cut may be left open instead of being closed with stitches, staples, or adhesive. A cut may be left open when it is likely to become infected, because closing it can make infection even more likely. You will probably have a bandage. The doctor may want the cut to stay open the whole time it heals. This happens with some cuts when too much time has gone by since the cut happened. Or the doctor may tell you to come back to have the cut closed in 4 to 5 days, when there is less chance of infection. If the cut stays open while healing, your scar may be larger than if the cut was closed. But you can get treatment later to make the scar smaller. The doctor has checked you carefully, but problems can develop later. If you notice any problems or new symptoms, get medical treatment right away. Follow-up care is a key part of your treatment and safety. Be sure to make and go to all appointments, and call your doctor if you are having problems. It's also a good idea to know your test results and keep a list of the medicines you take. How can you care for yourself at home? · Keep the cut dry for the first 24 to 48 hours. After this, you can shower if your doctor okays it. Pat the cut dry. · Don't soak the cut, such as in a bathtub. Your doctor will tell you when it's safe to get the cut wet. · If your doctor told you how to care for your cut, follow your doctor's instructions. If you did not get instructions, follow this general advice:  ¨ After the first 24 to 48 hours, wash the cut with clean water 2 times a day. Don't use hydrogen peroxide or alcohol, which can slow healing. ¨ You may cover the cut with a thin layer of petroleum jelly, such as Vaseline, and a nonstick bandage. ¨ Apply more petroleum jelly and replace the bandage as needed.   · Prop up the injured area on a pillow anytime you sit or lie down during the next 3 days. Try to keep it above the level of your heart. This will help reduce swelling. · Avoid any activity that could cause your cut to get worse. · Take pain medicines exactly as directed. ¨ If the doctor gave you a prescription medicine for pain, take it as prescribed. ¨ If you are not taking a prescription pain medicine, ask your doctor if you can take an over-the-counter medicine. When should you call for help? Call your doctor now or seek immediate medical care if:  ? · You have new pain, or your pain gets worse. ? · The cut starts to bleed, and blood soaks through the bandage. Oozing small amounts of blood is normal.   ? · The skin near the cut is cold or pale or changes color. ? · You have tingling, weakness, or numbness near the cut.   ? · You have trouble moving the area near the cut.   ? · You have symptoms of infection, such as:  ¨ Increased pain, swelling, warmth, or redness around the cut. ¨ Red streaks leading from the cut. ¨ Pus draining from the cut. ¨ A fever. ? Watch closely for changes in your health, and be sure to contact your doctor if:  ? · The cut is not closing (getting smaller). ? · You do not get better as expected. Where can you learn more? Go to http://hamlet-deyanira.info/. Enter [id number] in the search box to learn more about \"Cuts Left Open: Care Instructions. \"  Current as of: March 20, 2017  Content Version: 11.4  © 0405-9436 [employer]. Care instructions adapted under license by [employer] (which disclaims liability or warranty for this information). If you have questions about a medical condition or this instruction, always ask your healthcare professional. [address] any warranty or liability for your use of this information.

## 2018-02-10 NOTE — ED TRIAGE NOTES
Patient arrives with a laceration to her left foot onset 45 minutes. Patient states she stepped on glass. Bleeding controlled in triage. Denies pain. Unsure if there is glass in her foot.

## 2018-02-10 NOTE — ED PROVIDER NOTES
HPI Comments: 81 y.o. female with past medical history significant for mitral valve prolapse, asthma, HTN, who presents ambulatory to the ED with chief complaint of laceration to plantar arch of the left foot, and for evaluation of a possible foreign body. 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 She was walking through the house and accidentally stepped on a piece of glass. The glass lacerated the plantar arch of the left foot, and she is unsure if there is glass present in the wound which is why she decided to come to the ED. The injury occurred ~1 hour ago, and bleeding is controlled in the ED. She denies any anticoagulation therapy, but is unsure if her tetanus is up to date. She denies any other injuries or areas of pain. There are no other acute medical concerns at this time. Social hx: Negative for Tobacco use (former smoker); Positive for EtOH use (occasional); Negative for Illicit Drug Abuse   PCP: Osorio Nam MD     Note written by Rosalia Martinez. Kingsley Howe, as dictated by Dillan Evans NP 12:58 AM     The history is provided by the patient. No  was used.         Past Medical History:   Diagnosis Date    Asthma     Cardiac rhythm disorder or disturbance or change     MVP and heart murmur    Hypertension     Left upper quadrant pain 8/16/2013    Mitral valve prolapse        Past Surgical History:   Procedure Laterality Date    COLONOSCOPY N/A 1/30/2018    COLONOSCOPY performed by Adolfo Erazo MD at [address] [facility] HX GASTRIC BYPASS  [phone]    HX GYN      uterine fibroids removed before hysterectomy    HX HEART CATHETERIZATION  1996    negative    HX HYSTERECTOMY      HX OTHER SURGICAL      left middle finger cut nerve and repaired    HX PARTIAL HYSTERECTOMY  1995         Family History:   Problem Relation Age of Onset    Cancer Mother     Heart Disease Mother     Hypertension Mother    [address] [facility] Breast Cancer Mother     Cancer Father      leukemia    Cancer Sister      -breast    Breast Cancer Sister     Thyroid Disease Sister      precancer       Social History     Social History    Marital status:      Spouse name: N/A    Number of children: N/A    Years of education: N/A     Occupational History    Not on file. Social History Main Topics    Smoking status: Former Smoker     Packs/day: 1.00     Quit date: 1987    Smokeless tobacco: Never Used    Alcohol use Yes      Comment: occassional    Drug use: No    Sexual activity: Not on file     Other Topics Concern    Not on file     Social History Narrative         ALLERGIES: Azithromycin; Codeine; and Morphine    Review of Systems   Constitutional: Negative for activity change, appetite change, chills and fever. HENT: Negative for congestion, rhinorrhea, sinus pressure, sneezing and sore throat. Eyes: Negative for pain, discharge and visual disturbance. Respiratory: Negative for cough and shortness of breath. Cardiovascular: Negative for chest pain. Gastrointestinal: Negative for abdominal pain, diarrhea, nausea and vomiting. Genitourinary: Negative for dysuria, flank pain, frequency and urgency. Musculoskeletal: Negative for arthralgias, back pain, gait problem, joint swelling, myalgias and neck pain. Skin: Positive for wound (laceration, left foot). Negative for color change and rash. Neurological: Negative for dizziness, speech difficulty, light-headedness and headaches. Psychiatric/Behavioral: Negative for agitation, behavioral problems and confusion. All other systems reviewed and are negative. Vitals:    02/10/18 0032   BP: 141/68   Pulse: 81   Resp: 20   Temp: 98 °F (36.7 °C)   SpO2: 97%   Weight: 95.3 kg (210 lb)   Height: 5' 5\" (1.651 m)            Physical Exam   Constitutional: She is oriented to person, place, and time. She appears well-developed and well-nourished. No distress. HENT:   Head: Normocephalic and atraumatic. Right Ear: External ear normal.   Left Ear: External ear normal.   Nose: Nose normal.   Mouth/Throat: Oropharynx is clear and moist. No oropharyngeal exudate. Eyes: Conjunctivae and EOM are normal. Pupils are equal, round, and reactive to light. Neck: Normal range of motion. Neck supple. Cardiovascular: Normal rate, regular rhythm, normal heart sounds and intact distal pulses. Pulmonary/Chest: Effort normal and breath sounds normal.   Abdominal: Soft. Bowel sounds are normal. There is no tenderness. There is no rebound and no guarding. Musculoskeletal: Normal range of motion. Neurological: She is alert and oriented to person, place, and time. Skin: Skin is warm and dry. LLE: There is a 1.5 cm linear laceration on the plantar aspect of the left midfoot, wound is minimally gaping, no active bleeding   Psychiatric: She has a normal mood and affect. Her behavior is normal. Judgment and thought content normal.   Nursing note and vitals reviewed. Note written by Kingsley Cruz, as dictated by Jorge Luis Casanova NP 12:58 AM      MDM  Number of Diagnoses or Management Options  Foot injury, left, initial encounter: Foot laceration, left, initial encounter:   Diagnosis management comments: DDx: laceration, foot injury, possible FB     80 yo F presents 2/2 L foot injury w/ laceration. Manual pressure applied to wound bed and no FB expelled. Probed wound bed and no FB present. Wound cleaned w/ jet lavage w/ Betadine. Advised on wound care for home. Reasons to return to ED vs f/u with PCP. Amount and/or Complexity of Data Reviewed  Review and summarize past medical records: yes          ED Course       Procedures      Procedure Note - Laceration Repair:  1:24 AM  Procedure by Kassie Mckeon. Sonja Casanova NP. Complexity: Simple  1.5cm linear laceration to plantar left foot  was irrigated copiously with NS under jet lavage, prepped with Betadine.  The area was not anesthetized. The wound was explored with the following results: No foreign bodies found. The wound was repaired with Dermabond. The wound was closed with good hemostasis and approximation. Sterile dressing applied. Estimated blood loss: < 5 mLs  The procedure took 1-15 minutes, and pt tolerated well. LABORATORY TESTS:  No results found for this or any previous visit (from the past 12 hour(s)). IMAGING RESULTS:  No orders to display       MEDICATIONS GIVEN:  Medications   diph,Pertuss(AC),Tet Vac-PF (BOOSTRIX) 2.5-8-5 Lf-mcg suspension (not administered)   diph,Pertuss(AC),Tet Vac-PF (BOOSTRIX) suspension 0.5 mL (0.5 mL IntraMUSCular Given 2/10/18 0108)       IMPRESSION:  1. Foot injury, left, initial encounter    2. Foot laceration, left, initial encounter        PLAN:  1. Discharge Medication List as of 2/10/2018  1:25 AM      CONTINUE these medications which have NOT CHANGED    Details   predniSONE (STERAPRED DS) 10 mg dose pack Take 10 mg by mouth See Admin Instructions. See administration instruction per 10mg dose pack, Historical Med      albuterol (PROVENTIL HFA, VENTOLIN HFA, PROAIR HFA) 90 mcg/actuation inhaler Take 2 Puffs by inhalation every six (6) hours as needed for Wheezing., Historical Med      meloxicam (MOBIC) 7.5 mg tablet Take 7.5 mg by mouth daily. , Historical Med      metoprolol (LOPRESSOR) 50 mg tablet Take 50 mg by mouth daily. , Historical Med      triamterene-hydrochlorothiazide (DYAZIDE) 37.5-25 mg per capsule Take  by mouth daily. Historical Med           2. Follow-up Information     Follow up With Details Comments Contact Info    Your primary care as needed  Go to As needed     [facility] [place] EMERGENCY DEPT Go to As needed, If symptoms worsen [address] [facility]  [phone]        3. Return to ED if worse   Discharge Note:    The patient is ready for discharge.  The patient's signs, symptoms, diagnosis, and discharge instruction have been discussed and the patient has conveyed their understanding. The patient is to follow up as recommended or return to the ER should their symptoms worsen. Plan has been discussed and the patient is in agreement.     Oscar Bustillo NP

## 2018-04-02 ENCOUNTER — HOSPITAL ENCOUNTER (EMERGENCY)
Age: 73
Discharge: HOME | End: 2018-04-02
Attending: EMERGENCY MEDICINE
Payer: MEDICARE

## 2018-04-02 ENCOUNTER — APPOINTMENT (OUTPATIENT)
Dept: CT IMAGING | Age: 73
End: 2018-04-02
Attending: PHYSICIAN ASSISTANT
Payer: MEDICARE

## 2018-04-02 VITALS
DIASTOLIC BLOOD PRESSURE: 71 MMHG | HEIGHT: 65 IN | WEIGHT: 212.08 LBS | TEMPERATURE: 98.2 F | BODY MASS INDEX: 35.34 KG/M2 | RESPIRATION RATE: 18 BRPM | OXYGEN SATURATION: 98 % | HEART RATE: 91 BPM | SYSTOLIC BLOOD PRESSURE: 141 MMHG

## 2018-04-02 DIAGNOSIS — R11.11 VOMITING WITHOUT NAUSEA, INTRACTABILITY OF VOMITING NOT SPECIFIED, UNSPECIFIED VOMITING TYPE: Primary | ICD-10-CM

## 2018-04-02 LAB
ALBUMIN SERPL-MCNC: 3.9 G/DL (ref 3.5–5)
ALBUMIN/GLOB SERPL: 1.1 {RATIO} (ref 1.1–2.2)
ALP SERPL-CCNC: 85 U/L (ref 45–117)
ALT SERPL-CCNC: 26 U/L (ref 12–78)
ANION GAP SERPL CALC-SCNC: 10 MMOL/L (ref 5–15)
APPEARANCE UR: ABNORMAL
AST SERPL-CCNC: 31 U/L (ref 15–37)
BACTERIA URNS QL MICRO: NEGATIVE /HPF
BASOPHILS # BLD: 0 K/UL (ref 0–0.1)
BASOPHILS NFR BLD: 0 % (ref 0–1)
BILIRUB SERPL-MCNC: 0.7 MG/DL (ref 0.2–1)
BILIRUB UR QL CFM: POSITIVE
BUN SERPL-MCNC: 9 MG/DL (ref 6–20)
BUN/CREAT SERPL: 12 (ref 12–20)
CALCIUM SERPL-MCNC: 9.1 MG/DL (ref 8.5–10.1)
CHLORIDE SERPL-SCNC: 102 MMOL/L (ref 97–108)
CO2 SERPL-SCNC: 30 MMOL/L (ref 21–32)
COLOR UR: ABNORMAL
CREAT SERPL-MCNC: 0.77 MG/DL (ref 0.55–1.02)
DIFFERENTIAL METHOD BLD: NORMAL
EOSINOPHIL # BLD: 0.1 K/UL (ref 0–0.4)
EOSINOPHIL NFR BLD: 1 % (ref 0–7)
EPITH CASTS URNS QL MICRO: ABNORMAL /LPF
ERYTHROCYTE [DISTWIDTH] IN BLOOD BY AUTOMATED COUNT: 14.1 % (ref 11.5–14.5)
GLOBULIN SER CALC-MCNC: 3.7 G/DL (ref 2–4)
GLUCOSE SERPL-MCNC: 113 MG/DL (ref 65–100)
GLUCOSE UR STRIP.AUTO-MCNC: NEGATIVE MG/DL
GRAN CASTS URNS QL MICRO: ABNORMAL /LPF
HCT VFR BLD AUTO: 39 % (ref 35–47)
HGB BLD-MCNC: 12.6 G/DL (ref 11.5–16)
HGB UR QL STRIP: NEGATIVE
IMM GRANULOCYTES # BLD: 0 K/UL (ref 0–0.04)
IMM GRANULOCYTES NFR BLD AUTO: 0 % (ref 0–0.5)
KETONES UR QL STRIP.AUTO: NEGATIVE MG/DL
LEUKOCYTE ESTERASE UR QL STRIP.AUTO: ABNORMAL
LIPASE SERPL-CCNC: 72 U/L (ref 73–393)
LYMPHOCYTES # BLD: 2 K/UL (ref 0.8–3.5)
LYMPHOCYTES NFR BLD: 26 % (ref 12–49)
MCH RBC QN AUTO: 30.8 PG (ref 26–34)
MCHC RBC AUTO-ENTMCNC: 32.3 G/DL (ref 30–36.5)
MCV RBC AUTO: 95.4 FL (ref 80–99)
MONOCYTES # BLD: 0.7 K/UL (ref 0–1)
MONOCYTES NFR BLD: 9 % (ref 5–13)
MUCOUS THREADS URNS QL MICRO: ABNORMAL /LPF
NEUTS SEG # BLD: 4.7 K/UL (ref 1.8–8)
NEUTS SEG NFR BLD: 63 % (ref 32–75)
NITRITE UR QL STRIP.AUTO: NEGATIVE
NRBC # BLD: 0 K/UL (ref 0–0.01)
NRBC BLD-RTO: 0 PER 100 WBC
PH UR STRIP: 6.5 [PH] (ref 5–8)
PLATELET # BLD AUTO: 336 K/UL (ref 150–400)
PMV BLD AUTO: 10.3 FL (ref 8.9–12.9)
POTASSIUM SERPL-SCNC: 3.2 MMOL/L (ref 3.5–5.1)
PROT SERPL-MCNC: 7.6 G/DL (ref 6.4–8.2)
PROT UR STRIP-MCNC: 30 MG/DL
RBC # BLD AUTO: 4.09 M/UL (ref 3.8–5.2)
RBC #/AREA URNS HPF: ABNORMAL /HPF (ref 0–5)
SODIUM SERPL-SCNC: 142 MMOL/L (ref 136–145)
SP GR UR REFRACTOMETRY: 1.03 (ref 1–1.03)
UR CULT HOLD, URHOLD: NORMAL
UROBILINOGEN UR QL STRIP.AUTO: 1 EU/DL (ref 0.2–1)
WBC # BLD AUTO: 7.4 K/UL (ref 3.6–11)
WBC URNS QL MICRO: ABNORMAL /HPF (ref 0–4)

## 2018-04-02 PROCEDURE — 83690 ASSAY OF LIPASE: CPT | Performed by: PHYSICIAN ASSISTANT

## 2018-04-02 PROCEDURE — 99283 EMERGENCY DEPT VISIT LOW MDM: CPT

## 2018-04-02 PROCEDURE — 96361 HYDRATE IV INFUSION ADD-ON: CPT

## 2018-04-02 PROCEDURE — 74011250636 HC RX REV CODE- 250/636: Performed by: PHYSICIAN ASSISTANT

## 2018-04-02 PROCEDURE — 36415 COLL VENOUS BLD VENIPUNCTURE: CPT | Performed by: PHYSICIAN ASSISTANT

## 2018-04-02 PROCEDURE — 74176 CT ABD & PELVIS W/O CONTRAST: CPT

## 2018-04-02 PROCEDURE — 81001 URINALYSIS AUTO W/SCOPE: CPT | Performed by: PHYSICIAN ASSISTANT

## 2018-04-02 PROCEDURE — 87086 URINE CULTURE/COLONY COUNT: CPT | Performed by: PHYSICIAN ASSISTANT

## 2018-04-02 PROCEDURE — 85025 COMPLETE CBC W/AUTO DIFF WBC: CPT | Performed by: PHYSICIAN ASSISTANT

## 2018-04-02 PROCEDURE — 96374 THER/PROPH/DIAG INJ IV PUSH: CPT

## 2018-04-02 PROCEDURE — 80053 COMPREHEN METABOLIC PANEL: CPT | Performed by: PHYSICIAN ASSISTANT

## 2018-04-02 RX ORDER — ONDANSETRON 2 MG/ML
4 INJECTION INTRAMUSCULAR; INTRAVENOUS
Status: COMPLETED | OUTPATIENT
Start: 2018-04-02 | End: 2018-04-02

## 2018-04-02 RX ADMIN — ONDANSETRON 4 MG: 2 INJECTION INTRAMUSCULAR; INTRAVENOUS at 22:27

## 2018-04-02 RX ADMIN — SODIUM CHLORIDE 1000 ML: 900 INJECTION, SOLUTION INTRAVENOUS at 21:13

## 2018-04-03 RX ORDER — GABAPENTIN 300 MG/1
300 CAPSULE ORAL 3 TIMES DAILY
COMMUNITY

## 2018-04-03 RX ORDER — METAXALONE 800 MG/1
800 TABLET ORAL 3 TIMES DAILY
COMMUNITY

## 2018-04-03 NOTE — ED NOTES
Pt provided with saltine crackers and gingerale for PO challenge    2300: PO challenge unsuccessful; pt vomited immediately after eating 4 crackers and 4 oz gingerale.

## 2018-04-03 NOTE — ED PROVIDER NOTES
HPI Comments: Uday Saldivar is a 81 y.o. female  who presents by private vehicle to ER with c/o Patient presents with:  Vomiting  Patient with 2 week history of vomiting after eating. Patient reports she vomits solid and liquids immediately after eating. Patient reports she feels like the food gets stuck in her lower chest. Denies nausea. Patient reports history of gastric bypass with revision in 1987. She specifically denies any fevers, chills, nausea,  chest pain, shortness of breath, headache, rash, diarrhea, abdominal pain, urinary/bowel changes, sweating or weight loss. PCP: Osorio Nam MD   PMHx significant for: Past Medical History:  No date: Asthma  No date: Cardiac rhythm disorder or disturbance or ibarra*      Comment: MVP and heart murmur  No date: Hypertension  8/16/2013: Left upper quadrant pain  No date: Mitral valve prolapse   PSHx significant for: Past Surgical History:  1/30/2018: COLONOSCOPY N/A      Comment: COLONOSCOPY performed by Daisy Ledezma MD at [address] [place] [address],2006: HX GASTRIC BYPASS  No date: HX GYN      Comment: uterine fibroids removed before hysterectomy  1996: HX HEART CATHETERIZATION      Comment: negative  No date: HX HYSTERECTOMY  No date: HX OTHER SURGICAL      Comment: left middle finger cut nerve and repaired  1995: HX PARTIAL HYSTERECTOMY  Social Hx: Tobacco use: Smoking status: Former Smoker                                                              Packs/day: 1.00      Years: 0.00         Quit date: 2/21/1987  Smokeless status: Never Used                      ; EtOH use: The patient states she drinks occasionally per week.; Illicit Drug use: Allergies:   -- Azithromycin -- Other (comments)    --  Makes pt feel worse, run fever, get shaky. -- Codeine -- Itching   -- Morphine -- Itching    There are no other complaints, changes or physical findings at this time. Patient is a 81 y.o. female presenting with vomiting.  The history is provided by the patient. Vomiting    This is a new problem. The current episode started more than 1 week ago. The problem occurs 5 to 10 times per day. The problem has not changed since onset. The emesis has an appearance of stomach contents. There has been no fever. Pertinent negatives include no chills, no fever, no sweats, no abdominal pain, no diarrhea, no headaches, no arthralgias, no myalgias, no cough, no URI and no headaches. The patient is not pregnant. Her past medical history is significant for gastric bypass. Her pertinent negatives include no irritable bowel syndrome, no short gut syndrome, no bowel resection, no recent abdominal surgery, no malabsorption and no DM. Past Medical History:   Diagnosis Date    Asthma     Cardiac rhythm disorder or disturbance or change     MVP and heart murmur    Hypertension     Left upper quadrant pain 2013    Mitral valve prolapse        Past Surgical History:   Procedure Laterality Date    COLONOSCOPY N/A 2018    COLONOSCOPY performed by Jerman Acevedo MD at [address] [facility] HX GASTRIC BYPASS  [phone]    HX GYN      uterine fibroids removed before hysterectomy    HX HEART CATHETERIZATION      negative    HX HYSTERECTOMY      HX OTHER SURGICAL      left middle finger cut nerve and repaired    HX PARTIAL HYSTERECTOMY           Family History:   Problem Relation Age of Onset    Cancer Mother     Heart Disease Mother     Hypertension Mother     Breast Cancer Mother     Cancer Father      leukemia    Cancer Sister      -breast    Breast Cancer Sister     Thyroid Disease Sister      precancer       Social History     Social History    Marital status:      Spouse name: N/A    Number of children: N/A    Years of education: N/A     Occupational History    Not on file.      Social History Main Topics    Smoking status: Former Smoker     Packs/day: 1.00     Quit date: 1987    Smokeless tobacco: Never Used    Alcohol use Yes      Comment: occassional    Drug use: No    Sexual activity: Not on file     Other Topics Concern    Not on file     Social History Narrative         ALLERGIES: Azithromycin; Codeine; and Morphine    Review of Systems   Constitutional: Negative. Negative for chills and fever. HENT: Negative. Eyes: Negative. Respiratory: Negative. Negative for cough. Cardiovascular: Negative. Gastrointestinal: Positive for vomiting. Negative for abdominal pain and diarrhea. Endocrine: Negative. Genitourinary: Negative. Musculoskeletal: Negative. Negative for arthralgias and myalgias. Skin: Negative. Allergic/Immunologic: Negative. Neurological: Negative. Negative for headaches. Hematological: Negative. Psychiatric/Behavioral: Negative. All other systems reviewed and are negative. Vitals:    04/02/18 2041   BP: 151/73   Pulse: 80   Resp: 16   Temp: 98.2 °F (36.8 °C)   SpO2: 96%   Weight: 96.2 kg (212 lb 1.3 oz)   Height: 5' 5\" (1.651 m)            Physical Exam   Constitutional: She is oriented to person, place, and time. She appears well-developed. HENT:   Head: Normocephalic and atraumatic. Right Ear: External ear normal.   Left Ear: External ear normal.   Nose: Nose normal.   Mouth/Throat: Oropharynx is clear and moist. No oropharyngeal exudate. Eyes: Conjunctivae, EOM and lids are normal. Right eye exhibits no discharge. Left eye exhibits no discharge. Neck: Normal range of motion. No tracheal deviation present. No thyromegaly present. Cardiovascular: Normal rate, regular rhythm, normal heart sounds and intact distal pulses. Pulmonary/Chest: Effort normal and breath sounds normal.   Abdominal: Soft. Normal appearance and bowel sounds are normal. There is no tenderness. Musculoskeletal: Normal range of motion. Neurological: She is alert and oriented to person, place, and time. Skin: Skin is warm and dry.    Psychiatric: She has a normal mood and affect. Judgment normal.        MDM  Number of Diagnoses or Management Options  Vomiting without nausea, intractability of vomiting not specified, unspecified vomiting type:   Diagnosis management comments: Assesment/Plan- 81 y.o. Patient presents with:  Vomiting  differential includes: gastric outlet obstruction, esophageal stricture, food bolus. Labs and imaging reviewed with no acute findings. Patient unable to tolerating PO, patient able to handle own saliva. Discharge home. Recommend GI follow up. Patient educated on reasons to return to the ED. Amount and/or Complexity of Data Reviewed  Clinical lab tests: ordered and reviewed  Tests in the radiology section of CPT®: ordered and reviewed  Tests in the medicine section of CPT®: ordered and reviewed    Patient Progress  Patient progress: stable        ED Course       Procedures               CONSULT NOTE:   [address] spoke with Dr. Kristian Santos,   Specialty: GI  Discussed pt's hx, disposition, and available diagnostic and imaging results. Reviewed care plans. Consultant agrees with plans as outlined. Will pass along information for Dr. Jefferson Ferrari for patient to get follow up.

## 2018-04-03 NOTE — DISCHARGE INSTRUCTIONS
Nausea and Vomiting: Care Instructions  Your Care Instructions    When you are nauseated, you may feel weak and sweaty and notice a lot of saliva in your mouth. Nausea often leads to vomiting. Most of the time you do not need to worry about nausea and vomiting, but they can be signs of other illnesses. Two common causes of nausea and vomiting are stomach flu and food poisoning. Nausea and vomiting from viral stomach flu will usually start to improve within 24 hours. Nausea and vomiting from food poisoning may last from 12 to 48 hours. The doctor has checked you carefully, but problems can develop later. If you notice any problems or new symptoms, get medical treatment right away. Follow-up care is a key part of your treatment and safety. Be sure to make and go to all appointments, and call your doctor if you are having problems. It's also a good idea to know your test results and keep a list of the medicines you take. How can you care for yourself at home? · To prevent dehydration, drink plenty of fluids, enough so that your urine is light yellow or clear like water. Choose water and other caffeine-free clear liquids until you feel better. If you have kidney, heart, or liver disease and have to limit fluids, talk with your doctor before you increase the amount of fluids you drink. · Rest in bed until you feel better. · When you are able to eat, try clear soups, mild foods, and liquids until all symptoms are gone for 12 to 48 hours. Other good choices include dry toast, crackers, cooked cereal, and gelatin dessert, such as Jell-O. When should you call for help? Call 911 anytime you think you may need emergency care. For example, call if:  ? · You passed out (lost consciousness). ?Call your doctor now or seek immediate medical care if:  ? · You have symptoms of dehydration, such as:  ¨ Dry eyes and a dry mouth. ¨ Passing only a little dark urine.   ¨ Feeling thirstier than usual.   ? · You have new or worsening belly pain. ? · You have a new or higher fever. ? · You vomit blood or what looks like coffee grounds. ? Watch closely for changes in your health, and be sure to contact your doctor if:  ? · You have ongoing nausea and vomiting. ? · Your vomiting is getting worse. ? · Your vomiting lasts longer than 2 days. ? · You are not getting better as expected. Where can you learn more? Go to http://hamlet-deyanira.info/. Enter 25 [id number] in the search box to learn more about \"Nausea and Vomiting: Care Instructions. \"  Current as of: March 20, 2017  Content Version: 11.4  © 6937-6670 [employer]. Care instructions adapted under license by [employer] (which disclaims liability or warranty for this information). If you have questions about a medical condition or this instruction, always ask your healthcare professional. [place] [address] any warranty or liability for your use of this information. We hope that we have addressed all of your medical concerns. The examination and treatment you received in the Emergency Department were for an emergent problem and were not intended as complete care. It is important that you follow up with your healthcare provider(s) for ongoing care. If your symptoms worsen or do not improve as expected, and you are unable to reach your usual health care provider(s), you should return to the Emergency Department. Today's healthcare is undergoing tremendous change, and patient satisfaction surveys are one of the many tools to assess the quality of medical care. You may receive a survey from the [employer] organization regarding your experience in the Emergency Department. I hope that your experience has been completely positive, particularly the medical care that I provided. As such, please participate in the survey; anything less than excellent does not meet my expectations or intentions.         [facility] and Jose Nguyen participate in nationally recognized quality of care measures. If your blood pressure is greater than 120/80, as reported below, we urge that you seek medical care to address the potential of high blood pressure, commonly known as hypertension. Hypertension can be hereditary or can be caused by certain medical conditions, pain, stress, or \"white coat syndrome. \"       Please make an appointment with your health care provider(s) for follow up of your Emergency Department visit. VITALS:   Patient Vitals for the past 8 hrs:   Temp Pulse Resp BP SpO2   04/02/18 2041 98.2 °F (36.8 °C) 80 16 151/73 96 %          Thank you for allowing us to provide you with medical care today. We realize that you have many choices for your emergency care needs. Please choose us in the future for any continued health care needs. Jensen Vazquez, [address] [place], [place] [address]y [address].   Office: [phone]            Recent Results (from the past 24 hour(s))   CBC WITH AUTOMATED DIFF    Collection Time: 04/02/18  9:14 PM   Result Value Ref Range    WBC 7.4 3.6 - 11.0 K/uL    RBC 4.09 3.80 - 5.20 M/uL    HGB 12.6 11.5 - 16.0 g/dL    HCT 39.0 35.0 - 47.0 %    MCV 95.4 80.0 - 99.0 FL    MCH 30.8 26.0 - 34.0 PG    MCHC 32.3 30.0 - 36.5 g/dL    RDW 14.1 11.5 - 14.5 %    PLATELET 088 149 - 571 K/uL    MPV 10.3 8.9 - 12.9 FL    NRBC 0.0 0  WBC    ABSOLUTE NRBC 0.00 0.00 - 0.01 K/uL    NEUTROPHILS 63 32 - 75 %    LYMPHOCYTES 26 12 - 49 %    MONOCYTES 9 5 - 13 %    EOSINOPHILS 1 0 - 7 %    BASOPHILS 0 0 - 1 %    IMMATURE GRANULOCYTES 0 0.0 - 0.5 %    ABS. NEUTROPHILS 4.7 1.8 - 8.0 K/UL    ABS. LYMPHOCYTES 2.0 0.8 - 3.5 K/UL    ABS. MONOCYTES 0.7 0.0 - 1.0 K/UL    ABS. EOSINOPHILS 0.1 0.0 - 0.4 K/UL    ABS. BASOPHILS 0.0 0.0 - 0.1 K/UL    ABS. IMM.  GRANS. 0.0 0.00 - 0.04 K/UL    DF AUTOMATED     METABOLIC PANEL, COMPREHENSIVE    Collection Time: 04/02/18  9:14 PM   Result Value Ref Range    Sodium 142 136 - 145 mmol/L    Potassium 3.2 (L) 3.5 - 5.1 mmol/L    Chloride 102 97 - 108 mmol/L    CO2 30 21 - 32 mmol/L    Anion gap 10 5 - 15 mmol/L    Glucose 113 (H) 65 - 100 mg/dL    BUN 9 6 - 20 MG/DL    Creatinine 0.77 0.55 - 1.02 MG/DL    BUN/Creatinine ratio 12 12 - 20      GFR est AA >60 >60 ml/min/1.73m2    GFR est non-AA >60 >60 ml/min/1.73m2    Calcium 9.1 8.5 - 10.1 MG/DL    Bilirubin, total 0.7 0.2 - 1.0 MG/DL    ALT (SGPT) 26 12 - 78 U/L    AST (SGOT) 31 15 - 37 U/L    Alk. phosphatase 85 45 - 117 U/L    Protein, total 7.6 6.4 - 8.2 g/dL    Albumin 3.9 3.5 - 5.0 g/dL    Globulin 3.7 2.0 - 4.0 g/dL    A-G Ratio 1.1 1.1 - 2.2     LIPASE    Collection Time: 04/02/18  9:14 PM   Result Value Ref Range    Lipase 72 (L) 73 - 393 U/L   URINALYSIS W/MICROSCOPIC    Collection Time: 04/02/18  9:30 PM   Result Value Ref Range    Color DARK YELLOW      Appearance CLOUDY (A) CLEAR      Specific gravity 1.029 1.003 - 1.030      pH (UA) 6.5 5.0 - 8.0      Protein 30 (A) NEG mg/dL    Glucose NEGATIVE  NEG mg/dL    Ketone NEGATIVE  NEG mg/dL    Blood NEGATIVE  NEG      Urobilinogen 1.0 0.2 - 1.0 EU/dL    Nitrites NEGATIVE  NEG      Leukocyte Esterase MODERATE (A) NEG      WBC 20-50 0 - 4 /hpf    RBC 0-5 0 - 5 /hpf    Epithelial cells MODERATE (A) FEW /lpf    Bacteria NEGATIVE  NEG /hpf    Mucus 1+ (A) NEG /lpf    Granular cast 0-2 (A) NEG /lpf   URINE CULTURE HOLD SAMPLE    Collection Time: 04/02/18  9:30 PM   Result Value Ref Range    Urine culture hold        URINE ON HOLD IN MICROBIOLOGY DEPT FOR 3 DAYS. IF UNPRESERVED URINE IS SUBMITTED, IT CANNOT BE USED FOR ADDITIONAL TESTING AFTER 24 HRS, RECOLLECTION WILL BE REQUIRED.    BILIRUBIN, CONFIRM    Collection Time: 04/02/18  9:30 PM   Result Value Ref Range    Bilirubin UA, confirm POSITIVE (A) NEG         Ct Abd Pelv Wo Cont    Result Date: 4/2/2018  EXAM:  CT ABD PELV WO CONT INDICATION: epigastric pain, vomiting COMPARISON: 2016 CONTRAST:  None. TECHNIQUE: Thin axial images were obtained through the abdomen and pelvis. Coronal and sagittal reconstructions were generated. Oral contrast was not administered. CT dose reduction was achieved through use of a standardized protocol tailored for this examination and automatic exposure control for dose modulation. The absence of intravenous contrast material reduces the sensitivity for evaluation of the solid parenchymal organs of the abdomen. FINDINGS: Lung bases are unremarkable. Prior gastric bypass surgery. Liver and spleen are normal in size. The a pancreas appears unremarkable. There is no definite bowel wall thickening or obstruction. The appendix appears normal. There is no free air or free fluid. The bladder is midline but is not filled. Prior partial hysterectomy. impression: Prior gastric bypass surgery. No acute findings seen.

## 2018-04-03 NOTE — ED TRIAGE NOTES
Patient reports that she has not been able to [employer]" for about two weeks. Patient reports that she has tried to eat twice today and she has vomited three times today.

## 2018-04-03 NOTE — ED NOTES
PA reviewed discharge instructions and options with patient; patient verbalized understanding. Pt. Ambulated to exit without difficulty and in no signs of acute distress. Patient will follow up as discussed.

## 2018-04-04 ENCOUNTER — HOSPITAL ENCOUNTER (OUTPATIENT)
Age: 73
Setting detail: OUTPATIENT SURGERY
Discharge: HOME | End: 2018-04-04
Attending: SPECIALIST | Admitting: SPECIALIST
Payer: MEDICARE

## 2018-04-04 ENCOUNTER — ANESTHESIA EVENT (OUTPATIENT)
Dept: ENDOSCOPY | Age: 73
End: 2018-04-04
Payer: MEDICARE

## 2018-04-04 ENCOUNTER — ANESTHESIA (OUTPATIENT)
Dept: ENDOSCOPY | Age: 73
End: 2018-04-04
Payer: MEDICARE

## 2018-04-04 VITALS
WEIGHT: 211 LBS | RESPIRATION RATE: 17 BRPM | HEART RATE: 69 BPM | DIASTOLIC BLOOD PRESSURE: 65 MMHG | SYSTOLIC BLOOD PRESSURE: 126 MMHG | HEIGHT: 65 IN | TEMPERATURE: 97.9 F | BODY MASS INDEX: 35.16 KG/M2 | OXYGEN SATURATION: 100 %

## 2018-04-04 LAB
BACTERIA SPEC CULT: NORMAL
CC UR VC: NORMAL
SERVICE CMNT-IMP: NORMAL

## 2018-04-04 PROCEDURE — 74011250636 HC RX REV CODE- 250/636

## 2018-04-04 PROCEDURE — 74011250636 HC RX REV CODE- 250/636: Performed by: PHYSICIAN ASSISTANT

## 2018-04-04 PROCEDURE — 76060000031 HC ANESTHESIA FIRST 0.5 HR: Performed by: SPECIALIST

## 2018-04-04 PROCEDURE — 74011000250 HC RX REV CODE- 250

## 2018-04-04 PROCEDURE — 76040000019: Performed by: SPECIALIST

## 2018-04-04 RX ORDER — FENTANYL CITRATE 50 UG/ML
25 INJECTION, SOLUTION INTRAMUSCULAR; INTRAVENOUS AS NEEDED
Status: DISCONTINUED | OUTPATIENT
Start: 2018-04-04 | End: 2018-04-04 | Stop reason: HOSPADM

## 2018-04-04 RX ORDER — MIDAZOLAM HYDROCHLORIDE 1 MG/ML
.25-5 INJECTION, SOLUTION INTRAMUSCULAR; INTRAVENOUS AS NEEDED
Status: DISCONTINUED | OUTPATIENT
Start: 2018-04-04 | End: 2018-04-04 | Stop reason: HOSPADM

## 2018-04-04 RX ORDER — NALOXONE HYDROCHLORIDE 0.4 MG/ML
0.4 INJECTION, SOLUTION INTRAMUSCULAR; INTRAVENOUS; SUBCUTANEOUS
Status: DISCONTINUED | OUTPATIENT
Start: 2018-04-04 | End: 2018-04-04 | Stop reason: HOSPADM

## 2018-04-04 RX ORDER — ATROPINE SULFATE 0.1 MG/ML
0.5 INJECTION INTRAVENOUS
Status: DISCONTINUED | OUTPATIENT
Start: 2018-04-04 | End: 2018-04-04 | Stop reason: HOSPADM

## 2018-04-04 RX ORDER — DEXTROMETHORPHAN/PSEUDOEPHED 2.5-7.5/.8
1.2 DROPS ORAL
Status: DISCONTINUED | OUTPATIENT
Start: 2018-04-04 | End: 2018-04-04 | Stop reason: HOSPADM

## 2018-04-04 RX ORDER — EPINEPHRINE 0.1 MG/ML
1 INJECTION INTRACARDIAC; INTRAVENOUS
Status: DISCONTINUED | OUTPATIENT
Start: 2018-04-04 | End: 2018-04-04 | Stop reason: HOSPADM

## 2018-04-04 RX ORDER — LIDOCAINE HYDROCHLORIDE 20 MG/ML
INJECTION, SOLUTION EPIDURAL; INFILTRATION; INTRACAUDAL; PERINEURAL AS NEEDED
Status: DISCONTINUED | OUTPATIENT
Start: 2018-04-04 | End: 2018-04-04 | Stop reason: HOSPADM

## 2018-04-04 RX ORDER — FLUMAZENIL 0.1 MG/ML
0.2 INJECTION INTRAVENOUS
Status: DISCONTINUED | OUTPATIENT
Start: 2018-04-04 | End: 2018-04-04 | Stop reason: HOSPADM

## 2018-04-04 RX ORDER — SODIUM CHLORIDE 9 MG/ML
50 INJECTION, SOLUTION INTRAVENOUS CONTINUOUS
Status: DISCONTINUED | OUTPATIENT
Start: 2018-04-04 | End: 2018-04-04 | Stop reason: HOSPADM

## 2018-04-04 RX ORDER — PROPOFOL 10 MG/ML
INJECTION, EMULSION INTRAVENOUS AS NEEDED
Status: DISCONTINUED | OUTPATIENT
Start: 2018-04-04 | End: 2018-04-04 | Stop reason: HOSPADM

## 2018-04-04 RX ORDER — SODIUM CHLORIDE 9 MG/ML
INJECTION, SOLUTION INTRAVENOUS
Status: DISCONTINUED | OUTPATIENT
Start: 2018-04-04 | End: 2018-04-04 | Stop reason: HOSPADM

## 2018-04-04 RX ADMIN — PROPOFOL 70 MG: 10 INJECTION, EMULSION INTRAVENOUS at 07:06

## 2018-04-04 RX ADMIN — PROPOFOL 60 MG: 10 INJECTION, EMULSION INTRAVENOUS at 07:08

## 2018-04-04 RX ADMIN — SODIUM CHLORIDE 50 ML/HR: 900 INJECTION, SOLUTION INTRAVENOUS at 07:03

## 2018-04-04 RX ADMIN — LIDOCAINE HYDROCHLORIDE 20 MG: 20 INJECTION, SOLUTION EPIDURAL; INFILTRATION; INTRACAUDAL; PERINEURAL at 07:06

## 2018-04-04 RX ADMIN — SODIUM CHLORIDE: 9 INJECTION, SOLUTION INTRAVENOUS at 06:45

## 2018-04-04 RX ADMIN — PROPOFOL 60 MG: 10 INJECTION, EMULSION INTRAVENOUS at 07:10

## 2018-04-04 RX ADMIN — PROPOFOL 10 MG: 10 INJECTION, EMULSION INTRAVENOUS at 07:09

## 2018-04-04 NOTE — ROUTINE PROCESS
Lydia Chace  [birth year]  [id number]    Situation:  Verbal report received from: Jaquelin Marti RN  Procedure: Procedure(s):  ESOPHAGOGASTRODUODENOSCOPY (EGD)    Background:    Preoperative diagnosis: dysphagia  Postoperative diagnosis: Normal EGD  Gastric Bypass    :  Dr. Ada Helm  Assistant(s): Endoscopy Technician-1: Romy Elena  Endoscopy RN-1: Jaquelin Marti RN    Specimens: * No specimens in log *  H. Pylori  no    Assessment:  Intra-procedure medications     Anesthesia gave intra-procedure sedation and medications, see anesthesia flow sheet yes    Intravenous fluids: NS@ KVO     Vital signs stable     Abdominal assessment: round and soft     Recommendation:  Discharge patient per MD order. Family or Friend   Permission to share finding with family or friend yes    Endoscopy discharge instructions have been reviewed and given to patient and daughter. The patient and daughter verbalized understanding and acceptance of instructions.

## 2018-04-04 NOTE — H&P
81 y.o. female for open access EGD for problems with swallowing/eating in patient with gastric bypass. Several ER visits in last weeks. Additional data for completion of the targeted pre-endoscopy H&P will be provided under 'H&P interval notes'. Please see that document which will be attached to this.   Del Graham MD

## 2018-04-04 NOTE — PROCEDURES
[address] [facility] D. Glee Councilman, MD  [phone]      2018    Esophagogastroduodenoscopy (EGD) Procedure Note  Rj Jackson  : 1945  [facility] Medical Record Number: [id number]      Indications:    Vomiting, persitent of unclear etilogy  Referring Physician:  Homero Toribio MD  Anesthesia/Sedation: Conscious Sedation/Moderate Sedation  Endoscopist:  Dr. Salena Todd  Complications:  None  Estimated Blood Loss:  None    Permit:  The indications, risks, benefits and alternatives were reviewed with the patient or their decision maker who was provided an opportunity to ask questions and all questions were answered. The specific risks of esophagogastroduodenoscopy with conscious sedation were reviewed, including but not limited to anesthetic complication, bleeding, adverse drug reaction, missed lesion, infection, IV site reactions, and intestinal perforation which would lead to the need for surgical repair. Alternatives to EGD including radiographic imaging, observation without testing, or laboratory testing were reviewed as well as the limitations of those alternatives discussed. After considering the options and having all their questions answered, the patient or their decision maker provided both verbal and written consent to proceed. Procedure in Detail:  After obtaining informed consent, positioning of the patient in the left lateral decubitus position, and conduction of a pre-procedure pause or \"time out\" the endoscope was introduced into the mouth and advanced to the duodenum. A careful inspection was made, and findings or interventions are described below. Findings:   Esophagus:normal  Stomach: Status post gastric bypass. There is food residue in the restrictive pouch. This limits visualization but the anastomosis appears wide open.   I navigated through about 40cm of the jejunum without visualization of ulcer or stenosis or neoplasm. Duodenum/jejunum: normal      Specimens: none    Impression: Gastric bypass status, food in pouch, no explanation based on visualization today as I couldn't find a mechanical process (stricture, stenosis, ulcer.)  Problem might be farther downstream or might be motility related. Recommendations:  Get barium small bowel series. Thank you for entrusting me with this patient's care. Please do not hesitate to contact me with any questions or if I can be of assistance with any of your other patients' GI needs.   Signed By: Kayla Strauss MD                        April 4, 2018

## 2018-04-04 NOTE — PERIOP NOTES
Procedure being performed under MAC; Brionna Lynch CRNA at bedside monitoring patient at 7853. See anesthesia notes. Endoscope was pre-cleaned at bedside immediately following procedure by Shelli Reyes at 3070. Care of patient assumed from the anesthesia provider at 6212. Patient tolerated procedure well. Abdomen remains soft and non tender post procedure, no complaints or indication of discomfort noted at this time. See anesthesia note. Patient transferred to Endoscopy Recovery and report given to recovery nurse Thais Lerner.

## 2018-04-04 NOTE — IP AVS SNAPSHOT
Summary of Care Report The Summary of Care report has been created to help improve care coordination. Users with access to [employer] or [address] (Web-based application) may access additional patient information including the Discharge Summary. If you are not currently a [address] user and need more information, please call the number listed below in the [place] [address] section and ask to be connected with Medical Records. Facility Information Name Address Phone [address] [place] [id number] [phone] Patient Information Patient Name Sex  Selin Salazar ([id number]) Female 1945 Discharge Information Admitting Provider Service Area Unit Gracy Kellogg MD / [facility] Endoscopy / [phone] Discharge Provider Discharge Date/Time Discharge Disposition Destination (none) (none) (none) (none) Patient Language Language ENGLISH [13] Hospital Problems as of 2018  Reviewed: 2016  9:26 PM by Alcides Philip MD  
 None Non-Hospital Problems as of 2018  Reviewed: 2016  9:26 PM by Alcides Philip MD  
  
  
  
 Class Noted - Resolved Last Modified Active Problems Left upper quadrant pain  2013 - Present 2013 by Brissa Mtz MD  
  Entered by Brissa Mtz MD  
  Diarrhea  2013 - Present 2013 by Brissa Mtz MD  
  Entered by Brissa Mtz MD  
  Weight loss  2013 - Present 2013 by Brissa Mtz MD  
  Entered by Brissa Mtz MD  
  Lytic lesion of bone on x-ray  2016 - Present 2016 by Ronal Chavez NP Entered by Ronal Chavez NP Pathologic fracture of sternum  2016 - Present 2016 by Ronal Chavez NP   Entered by Ronal Chavez NP  
  
 You are allergic to the following Allergen Reactions Azithromycin Other (comments) Makes pt feel worse, run fever, get shaky. Codeine Itching Morphine Itching Percocet (Oxycodone-Acetaminophen) Itching Cannot take pill form Current Discharge Medication List  
  
CONTINUE these medications which have NOT CHANGED Dose & Instructions Dispensing Information Comments  
 albuterol 90 mcg/actuation inhaler Commonly known as:  PROVENTIL HFA, VENTOLIN HFA, PROAIR HFA Dose:  2 Puff Take 2 Puffs by inhalation every six (6) hours as needed for Wheezing. Refills:  0  
   
 gabapentin 300 mg capsule Commonly known as:  NEURONTIN Dose:  300 mg Take 300 mg by mouth three (3) times daily. Refills:  0  
   
 meloxicam 7.5 mg tablet Commonly known as:  MOBIC Dose:  7.5 mg Take 7.5 mg by mouth daily. Refills:  0  
   
 metaxalone 800 mg tablet Commonly known as:  SKELAXIN Dose:  800 mg Take 800 mg by mouth three (3) times daily. Refills:  0  
   
 metoprolol tartrate 50 mg tablet Commonly known as:  LOPRESSOR Dose:  50 mg Take 50 mg by mouth daily. Refills:  0  
   
 triamterene-hydroCHLOROthiazide 37.5-25 mg per capsule Commonly known as:  Valencia Shoemaker Take  by mouth daily. Refills:  0  
   
 ZOLOFT PO Take  by mouth. Refills:  0 Current Immunizations Name Date Tdap 2/10/2018 Surgery Information ID Date/Time Status Primary Surgeon All Procedures Location [id number] 4/4/2018 0700 Unposted Conchita Ford MD ESOPHAGOGASTRODUODENOSCOPY (EGD) [facility] ENDOSCOPY Follow-up Information None Discharge Instructions [address] Felisa Pierce, [address] [place] [address] 
[phone] April 4, 2018 Mc Green YOB: 1945 ENDOSCOPY DISCHARGE INSTRUCTIONS If there is redness at IV site you should apply warm compress to area. If redness or soreness persist contact Dr. Myrna Lowe or your primary care doctor. Gaseous discomfort may develop, but walking, belching will help relieve this. You may not operate a vehicle for 12 hours You may not operate machinery or dangerous appliances for rest of today You may not drink alcoholic beverages for 12 hours Avoid making any critical decisions for 24 hours DIET: 
You may resume your normal diet, but some patients find that heavy or large meals may lead to indigestion or vomiting. I suggest a light meal as first food intake. MEDICATIONS: 
The use of some over-the-counter pain medication may lead to bleeding after biopsies or other procedures you may have had done. Tylenol (also called acetaminophen) is safe to take and will not lead to bleeding. Based on the procedure you had today you may safely take aspirin or aspirin-like products for the next ten (10) days. ACTIVITY: 
You may resume your normal household activities, but it is recommended that you spend the remainder of the day resting -  avoid any strenuous activity. CALL DR. Yusra Quiñonez' OFFICE IF: Increasing pain, nausea, vomiting Abdominal distension (swelling) Significant new or increased bleeding (oral or rectal) Fever/Chills Chest pain/shortness of breath Additional instructions: We found no significant process except some food in the stomach which is unexpected. No ulcer no cancer and no narrowing of either the esophagus or the stomach. The problem might be farther downstream - I will get additional xrays to check on that. It was an honor to be your doctor today. Please let me or my office staff know if you have any feedback about today's procedure. Ileana Lemus MD 
 
 
 
 
Chart Review Routing History No Routing History on File

## 2018-04-04 NOTE — ANESTHESIA POSTPROCEDURE EVALUATION
Post-Anesthesia Evaluation and Assessment    Patient: Tom Frazier MRN: [id number]  SSN: [id number]    YOB: 1945  Age: 81 y.o. Sex: female       Cardiovascular Function/Vital Signs  Visit Vitals    /65    Pulse 69    Temp 36.6 °C (97.9 °F)    Resp 17    Ht 5' 5\" (1.651 m)    Wt 95.7 kg (211 lb)    SpO2 100%    Breastfeeding No    BMI 35.11 kg/m2       Patient is status post MAC anesthesia for Procedure(s):  ESOPHAGOGASTRODUODENOSCOPY (EGD). Nausea/Vomiting: None    Postoperative hydration reviewed and adequate. Pain:  Pain Scale 1: Numeric (0 - 10) (04/04/18 0733)  Pain Intensity 1: 0 (04/04/18 0733)   Managed    Neurological Status: At baseline    Mental Status and Level of Consciousness: Arousable    Pulmonary Status:   O2 Device: Room air (04/04/18 0738)   Adequate oxygenation and airway patent    Complications related to anesthesia: None    Post-anesthesia assessment completed.  No concerns    Signed By: Otto Saeed MD     April 4, 2018

## 2018-04-04 NOTE — INTERVAL H&P NOTE
Pre-Endoscopy H&P Update  Chief complaint/HPI/ROS:  The indication for the procedure, the patient's history and the patient's current medications are reviewed prior to the procedure and that data is reported on the H&P to which this document is attached. Any significant complaints with regard to organ systems will be noted, and if not mentioned then a review of systems is not contributory. Past Medical History:   Diagnosis Date    Asthma     Cardiac rhythm disorder or disturbance or change     MVP and heart murmur    Hypertension     Left upper quadrant pain 2013    Mitral valve prolapse       Past Surgical History:   Procedure Laterality Date    COLONOSCOPY N/A 2018    COLONOSCOPY performed by Clint Gustafson MD at [address] [facility] HX GASTRIC BYPASS  [phone]    HX GYN      uterine fibroids removed before hysterectomy    HX HEART CATHETERIZATION      negative    HX OTHER SURGICAL      left middle finger cut nerve and repaired    HX PARTIAL HYSTERECTOMY       Social   Social History   Substance Use Topics    Smoking status: Former Smoker     Packs/day: 1.00     Quit date: 1987    Smokeless tobacco: Never Used    Alcohol use Yes      Comment: occassional      Family History   Problem Relation Age of Onset    Cancer Mother     Heart Disease Mother     Hypertension Mother     Breast Cancer Mother     Cancer Father      leukemia    Cancer Sister      -breast    Breast Cancer Sister     Thyroid Disease Sister      precancer      Allergies   Allergen Reactions    Azithromycin Other (comments)     Makes pt feel worse, run fever, get shaky.  Codeine Itching    Morphine Itching    Percocet [Oxycodone-Acetaminophen] Itching     Cannot take pill form      Prior to Admission Medications   Prescriptions Last Dose Informant Patient Reported? Taking? SERTRALINE HCL (ZOLOFT PO) 2017  Yes Yes   Sig: Take  by mouth.    albuterol (PROVENTIL HFA, VENTOLIN HFA, PROAIR HFA) 90 mcg/actuation inhaler 8/4/2017  Yes No   Sig: Take 2 Puffs by inhalation every six (6) hours as needed for Wheezing. gabapentin (NEURONTIN) 300 mg capsule 3/19/2018 at am  Yes No   Sig: Take 300 mg by mouth three (3) times daily. meloxicam (MOBIC) 7.5 mg tablet 3/19/2018 at am  Yes No   Sig: Take 7.5 mg by mouth daily. metaxalone (SKELAXIN) 800 mg tablet 3/19/2018  Yes Yes   Sig: Take 800 mg by mouth three (3) times daily. metoprolol (LOPRESSOR) 50 mg tablet 3/19/2018 at am  Yes No   Sig: Take 50 mg by mouth daily. triamterene-hydrochlorothiazide (DYAZIDE) 37.5-25 mg per capsule 4/4/2018 at am  Yes Yes   Sig: Take  by mouth daily. Facility-Administered Medications: None       PHYSICAL EXAM:  The patient is examined immediately prior to the procedure. Visit Vitals    /63    Pulse 79    Temp 98.2 °F (36.8 °C)    Resp 16    Ht 5' 5\" (1.651 m)    Wt 95.7 kg (211 lb)    SpO2 100%    Breastfeeding No    BMI 35.11 kg/m2     Gen: Appears comfortable, no distress. Pulm: comfortable respirations with no abnormal audible breath sounds  CV: heart regular, well perfused  GI: abdomen flat. PLAN:  Informed consent discussion held, patient afforded an opportunity to ask questions and all questions answered. After being advised of the risks, benefits, and alternatives, the patient requested that we proceed and indicated so on a written consent form. Will proceed with procedure as planned.   Albert Beasley MD

## 2018-04-04 NOTE — IP AVS SNAPSHOT
[address] [facility] [address] [facility] 
[phone] Patient: Adam Carter MRN: [id number] HUV:7/62/0076 About your hospitalization You were admitted on:  April 4, 2018 You last received care in the:  [facility] ENDOSCOPY You were discharged on:  April 4, 2018 Why you were hospitalized Your primary diagnosis was:  Not on File Follow-up Information None Discharge Orders None A check rae indicates which time of day the medication should be taken. My Medications CONTINUE taking these medications Instructions Each Dose to Equal  
 Morning Noon Evening Bedtime  
 albuterol 90 mcg/actuation inhaler Commonly known as:  PROVENTIL HFA, VENTOLIN HFA, PROAIR HFA Your last dose was: Your next dose is: Take 2 Puffs by inhalation every six (6) hours as needed for Wheezing. 2 Puff  
    
   
   
   
  
 gabapentin 300 mg capsule Commonly known as:  NEURONTIN Your last dose was: Your next dose is: Take 300 mg by mouth three (3) times daily. 300 mg  
    
   
   
   
  
 meloxicam 7.5 mg tablet Commonly known as:  MOBIC Your last dose was: Your next dose is: Take 7.5 mg by mouth daily. 7.5 mg  
    
   
   
   
  
 metaxalone 800 mg tablet Commonly known as:  SKELAXIN Your last dose was: Your next dose is: Take 800 mg by mouth three (3) times daily. 800 mg  
    
   
   
   
  
 metoprolol tartrate 50 mg tablet Commonly known as:  LOPRESSOR Your last dose was: Your next dose is: Take 50 mg by mouth daily. 50 mg  
    
   
   
   
  
 triamterene-hydroCHLOROthiazide 37.5-25 mg per capsule Commonly known as:  Tarsha Life Your last dose was: Your next dose is: Take  by mouth daily.   
     
   
   
   
  
 ZOLOFT PO  
   
 Your last dose was: Your next dose is: Take  by mouth. Discharge Instructions [address] Brenden Ferrell, [address] [place] [address] 
[phone] April 4, 2018 Angeline Kirby YOB: 1945 ENDOSCOPY DISCHARGE INSTRUCTIONS If there is redness at IV site you should apply warm compress to area. If redness or soreness persist contact Dr. Brenden Ferrell' or your primary care doctor. Gaseous discomfort may develop, but walking, belching will help relieve this. You may not operate a vehicle for 12 hours You may not operate machinery or dangerous appliances for rest of today You may not drink alcoholic beverages for 12 hours Avoid making any critical decisions for 24 hours DIET: 
You may resume your normal diet, but some patients find that heavy or large meals may lead to indigestion or vomiting. I suggest a light meal as first food intake. MEDICATIONS: 
The use of some over-the-counter pain medication may lead to bleeding after biopsies or other procedures you may have had done. Tylenol (also called acetaminophen) is safe to take and will not lead to bleeding. Based on the procedure you had today you may safely take aspirin or aspirin-like products for the next ten (10) days. ACTIVITY: 
You may resume your normal household activities, but it is recommended that you spend the remainder of the day resting -  avoid any strenuous activity. CALL DR. Reinaldo Barba' OFFICE IF: Increasing pain, nausea, vomiting Abdominal distension (swelling) Significant new or increased bleeding (oral or rectal) Fever/Chills Chest pain/shortness of breath Additional instructions: We found no significant process except some food in the stomach which is unexpected.   No ulcer no cancer and no narrowing of either the esophagus or the stomach. The problem might be farther downstream - I will get additional xrays to check on that. It was an honor to be your doctor today. Please let me or my office staff know if you have any feedback about today's procedure. Krzysztof Parry MD 
 
 
 
 
  
  
  
Introducing \[facility] [place] [facility]\"" & HEALTH SERVICES! Ayse Littlejohnimes introduces [employer] patient portal. Now you can access parts of your medical record, email your doctor's office, and request medication refills online. 1. In your internet browser, go to [email or web address] 2. Click on the First Time User? Click Here link in the Sign In box. You will see the New Member Sign Up page. 3. Enter your [employer] Access Code exactly as it appears below. You will not need to use this code after youve completed the sign-up process. If you do not sign up before the expiration date, you must request a new code. · [employer] Access Code: [email or web address] Expires: 4/30/2018  9:39 AM 
 
4. Enter the last four digits of your Social Security Number (xxxx) and Date of Birth (mm/dd/yyyy) as indicated and click Submit. You will be taken to the next sign-up page. 5. Create a [facility] ID. This will be your [facility] login ID and cannot be changed, so think of one that is secure and easy to remember. 6. Create a [employer] password. You can change your password at any time. 7. Enter your Password Reset Question and Answer. This can be used at a later time if you forget your password. 8. Enter your e-mail address. You will receive e-mail notification when new information is available in [address] 9. Click Sign Up. You can now view and download portions of your medical record. 10. Click the Download Summary menu link to download a portable copy of your medical information. If you have questions, please visit the Frequently Asked Questions section of the [employer] website. Remember, [facility] is NOT to be used for urgent needs. For medical emergencies, dial 911. Now available from your iPhone and Android! Introducing [employer] As a [facility] patient, I wanted to make you aware of our electronic visit tool called [employer]. [employer] allows you to connect within minutes with a medical provider 24 hours a day, seven days a week via a mobile device or tablet or logging into a secure website from your computer. You can access [employer] from anywhere in the [place]. A virtual visit might be right for you when you have a simple condition and feel like you just dont want to get out of bed, or cant get away from work for an appointment, when your regular [facility] provider is not available (evenings, weekends or holidays), or when youre out of town and need minor care. Electronic visits cost only $49 and if the [employer] provider determines a prescription is needed to treat your condition, one can be electronically transmitted to a nearby pharmacy*. Please take a moment to enroll today if you have not already done so. The enrollment process is free and takes just a few minutes. To enroll, please download the [employer] patricia to your tablet or phone, or visit [email or web address] to enroll on your computer. And, as an [address] [place] [address] patient with a [employer] account, the results of your visits will be scanned into your electronic medical record and your primary care provider will be able to view the scanned results. We urge you to continue to see your regular [facility] provider for your ongoing medical care. And while your primary care provider may not be the one available when you seek a [facility] virtual visit, the peace of mind you get from getting a real diagnosis real time can be priceless. For more information on Winston Ross, view our Frequently Asked Questions (FAQs) at [email or web address]. Sincerely, 
 
Landon Sánchez MD 
Chief Medical Officer [facility] Verónica Nguyen *:  certain medications cannot be prescribed via [employer] Providers Seen During Your Hospitalization Provider Specialty Primary office phone Lisa Garcia MD Gastroenterology [phone] Your Primary Care Physician (PCP) Primary Care Physician Office Phone Office Fax OTHER, PHYS ** None ** ** None ** You are allergic to the following Allergen Reactions Azithromycin Other (comments) Makes pt feel worse, run fever, get shaky. Codeine Itching Morphine Itching Percocet (Oxycodone-Acetaminophen) Itching Cannot take pill form Recent Documentation Height Weight Breastfeeding? BMI OB Status Smoking Status 1.651 m 95.7 kg No 35.11 kg/m2 Hysterectomy Former Smoker Emergency Contacts Name Discharge Info Relation Home Work Mobile 71 Swapna Marroquin CAREGIVER [3] Child [2] [phone] Nikos Landaverde-Eryn DISCHARGE CAREGIVER [3] Child [2] [phone] Patient Belongings The following personal items are in your possession at time of discharge: 
  Dental Appliances: Partials, Lowers, Uppers (Partial lowers; Full uppers with patient belongings)  Visual Aid: Glasses, Contacts, Sent home Please provide this summary of care documentation to your next provider. Signatures-by signing, you are acknowledging that this After Visit Summary has been reviewed with you and you have received a copy. Patient Signature:  ____________________________________________________________ Date:  ____________________________________________________________  
  
Fidencio Akbar Provider Signature:  ____________________________________________________________ Date:  ____________________________________________________________

## 2018-04-04 NOTE — ANESTHESIA PREPROCEDURE EVALUATION
Anesthetic History   No history of anesthetic complications            Review of Systems / Medical History  Patient summary reviewed    Pulmonary            Asthma : well controlled       Neuro/Psych   Within defined limits           Cardiovascular    Hypertension              Exercise tolerance: >4 METS     GI/Hepatic/Renal     GERD           Endo/Other  Within defined limits           Other Findings              Physical Exam    Airway  Mallampati: I  TM Distance: 4 - 6 cm  Neck ROM: normal range of motion   Mouth opening: Normal     Cardiovascular    Rhythm: regular  Rate: normal         Dental    Dentition: Poor dentition  Comments: Missing multiple teeth, pt reports that none are loose   Pulmonary  Breath sounds clear to auscultation               Abdominal         Other Findings            Anesthetic Plan    ASA: 2  Anesthesia type: MAC            Anesthetic plan and risks discussed with: Patient

## 2018-04-04 NOTE — DISCHARGE INSTRUCTIONS
[address] [facility] GOKUL Parra MD  [phone]      April 4, 2018     Uday Saldivar  Birthdate: [birth year]    ENDOSCOPY DISCHARGE INSTRUCTIONS    If there is redness at IV site you should apply warm compress to area. If redness or soreness persist contact Dr. Shauna Parra' or your primary care doctor. Gaseous discomfort may develop, but walking, belching will help relieve this. You may not operate a vehicle for 12 hours  You may not operate machinery or dangerous appliances for rest of today  You may not drink alcoholic beverages for 12 hours  Avoid making any critical decisions for 24 hours    DIET:  You may resume your normal diet, but some patients find that heavy or large meals may lead to indigestion or vomiting. I suggest a light meal as first food intake. MEDICATIONS:  The use of some over-the-counter pain medication may lead to bleeding after biopsies or other procedures you may have had done. Tylenol (also called acetaminophen) is safe to take and will not lead to bleeding. Based on the procedure you had today you may safely take aspirin or aspirin-like products for the next ten (10) days. ACTIVITY:  You may resume your normal household activities, but it is recommended that you spend the remainder of the day resting -  avoid any strenuous activity. CALL DR. Simran Hermosillo' OFFICE IF:  Increasing pain, nausea, vomiting  Abdominal distension (swelling)  Significant new or increased bleeding (oral or rectal)  Fever/Chills  Chest pain/shortness of breath                   Additional instructions: We found no significant process except some food in the stomach which is unexpected. No ulcer no cancer and no narrowing of either the esophagus or the stomach. The problem might be farther downstream - I will get additional xrays to check on that. It was an honor to be your doctor today.   Please let me or my office staff know if you have any feedback about today's procedure.     Krzysztof Parry MD

## 2018-05-07 ENCOUNTER — HOSPITAL ENCOUNTER (OUTPATIENT)
Dept: GENERAL RADIOLOGY | Age: 73
Discharge: HOME | End: 2018-05-07
Attending: SPECIALIST
Payer: MEDICARE

## 2018-05-07 DIAGNOSIS — R11.10 VOMITING: ICD-10-CM

## 2018-05-07 PROCEDURE — 74245 XR UPPER GI/SMALL BOWEL: CPT

## 2018-05-15 ENCOUNTER — HOSPITAL ENCOUNTER (OUTPATIENT)
Dept: ULTRASOUND IMAGING | Age: 73
Discharge: HOME | End: 2018-05-15
Attending: SPECIALIST
Payer: MEDICARE

## 2018-05-15 DIAGNOSIS — C48.2 MALIGNANT NEOPLASM OF PERITONEUM, UNSPECIFIED (HCC): ICD-10-CM

## 2018-05-15 PROCEDURE — 76856 US EXAM PELVIC COMPLETE: CPT

## 2018-05-15 PROCEDURE — 76830 TRANSVAGINAL US NON-OB: CPT

## 2019-10-26 ENCOUNTER — HOSPITAL ENCOUNTER (EMERGENCY)
Age: 74
Discharge: HOME | End: 2019-10-26
Attending: STUDENT IN AN ORGANIZED HEALTH CARE EDUCATION/TRAINING PROGRAM
Payer: MEDICARE

## 2019-10-26 ENCOUNTER — APPOINTMENT (OUTPATIENT)
Dept: GENERAL RADIOLOGY | Age: 74
End: 2019-10-26
Attending: EMERGENCY MEDICINE
Payer: MEDICARE

## 2019-10-26 VITALS
SYSTOLIC BLOOD PRESSURE: 193 MMHG | DIASTOLIC BLOOD PRESSURE: 113 MMHG | OXYGEN SATURATION: 97 % | HEIGHT: 65 IN | WEIGHT: 166 LBS | HEART RATE: 90 BPM | BODY MASS INDEX: 27.66 KG/M2 | TEMPERATURE: 98.2 F | RESPIRATION RATE: 20 BRPM

## 2019-10-26 DIAGNOSIS — S61.511A LACERATION OF SKIN OF RIGHT WRIST, INITIAL ENCOUNTER: Primary | ICD-10-CM

## 2019-10-26 PROCEDURE — 73110 X-RAY EXAM OF WRIST: CPT

## 2019-10-26 PROCEDURE — 75810000293 HC SIMP/SUPERF WND  RPR

## 2019-10-26 PROCEDURE — 74011250636 HC RX REV CODE- 250/636: Performed by: EMERGENCY MEDICINE

## 2019-10-26 PROCEDURE — 90471 IMMUNIZATION ADMIN: CPT

## 2019-10-26 PROCEDURE — 99282 EMERGENCY DEPT VISIT SF MDM: CPT

## 2019-10-26 PROCEDURE — 74011250637 HC RX REV CODE- 250/637: Performed by: EMERGENCY MEDICINE

## 2019-10-26 PROCEDURE — L3809 WHFO W/O JOINTS PRE OTS: HCPCS

## 2019-10-26 PROCEDURE — 90715 TDAP VACCINE 7 YRS/> IM: CPT | Performed by: EMERGENCY MEDICINE

## 2019-10-26 PROCEDURE — 74011000250 HC RX REV CODE- 250: Performed by: EMERGENCY MEDICINE

## 2019-10-26 RX ORDER — CLINDAMYCIN HYDROCHLORIDE 300 MG/1
300 CAPSULE ORAL 4 TIMES DAILY
Qty: 28 CAP | Refills: 0 | Status: SHIPPED | OUTPATIENT
Start: 2019-10-26 | End: 2019-11-02

## 2019-10-26 RX ORDER — CLINDAMYCIN HYDROCHLORIDE 150 MG/1
600 CAPSULE ORAL
Status: COMPLETED | OUTPATIENT
Start: 2019-10-26 | End: 2019-10-26

## 2019-10-26 RX ORDER — LIDOCAINE HYDROCHLORIDE AND EPINEPHRINE 10; 10 MG/ML; UG/ML
1.5 INJECTION, SOLUTION INFILTRATION; PERINEURAL ONCE
Status: COMPLETED | OUTPATIENT
Start: 2019-10-26 | End: 2019-10-26

## 2019-10-26 RX ADMIN — LIDOCAINE HYDROCHLORIDE,EPINEPHRINE BITARTRATE 15 MG: 10; .01 INJECTION, SOLUTION INFILTRATION; PERINEURAL at 15:31

## 2019-10-26 RX ADMIN — CLINDAMYCIN HYDROCHLORIDE 600 MG: 150 CAPSULE ORAL at 15:30

## 2019-10-26 RX ADMIN — TETANUS TOXOID, REDUCED DIPHTHERIA TOXOID AND ACELLULAR PERTUSSIS VACCINE, ADSORBED 0.5 ML: 5; 2.5; 8; 8; 2.5 SUSPENSION INTRAMUSCULAR at 15:31

## 2019-10-26 NOTE — ED TRIAGE NOTES
Laceration to lateral inner right wrist when trying to change dryer belt this afternoon, bleeding is controlled. Tetanus needs updated.

## 2019-10-26 NOTE — ED NOTES
Ring attempted to be removed from patient right finger with lubrication and lotion, attempts were unsuccessful. Patient verbally gave permission for ring to be cut off. Ring cut off and given to patient daughter.

## 2019-10-26 NOTE — DISCHARGE INSTRUCTIONS

## 2019-10-26 NOTE — ED NOTES
Non stick dressing applied to right wrist suture site. Patient given discharge instructions per provider, one RX given. Patient ambulatory from ED to home with daughter.

## 2019-10-26 NOTE — ED PROVIDER NOTES
69 y.o. female with past medical history significant for mitral valve prolapse, asthma, and HTN who presents from private vehicle with chief complaint of laceration. Pt reports a laceration to the right wrist that occurred when trying to replace a dryer belt at home PTA. Pt's bleeding is controlled. Pt states she finished an antibiotic 1 month ago for UTI. Per relative, Pt recently completed chemotherapy for ovarian cancer. Pt is unsure of her last tetanus vaccination. There are no other acute medical concerns at this time. PCP: Mo Barrett MD    Note written by Kingsley Ortiz, as dictated by Tarsha Lucas NP 1:39 PM      The history is provided by the patient. No  was used.         Past Medical History:   Diagnosis Date    Asthma     Cardiac rhythm disorder or disturbance or change     MVP and heart murmur    Hypertension     Left upper quadrant pain 2013    Mitral valve prolapse        Past Surgical History:   Procedure Laterality Date    COLONOSCOPY N/A 2018    COLONOSCOPY performed by Kimberley Marmolejo MD at [facility] [address] GASTRIC BYPASS  [id number]    HX GYN      uterine fibroids removed before hysterectomy    HX HEART CATHETERIZATION      negative    HX OTHER SURGICAL      left middle finger cut nerve and repaired    HX PARTIAL HYSTERECTOMY           Family History:   Problem Relation Age of Onset    Cancer Mother     Heart Disease Mother     Hypertension Mother     Breast Cancer Mother     Cancer Father         leukemia    Cancer Sister         -breast    Breast Cancer Sister     Thyroid Disease Sister         precancer       Social History     Socioeconomic History    Marital status:      Spouse name: Not on file    Number of children: Not on file    Years of education: Not on file    Highest education level: Not on file   Occupational History    Not on file   Social Needs    Financial resource strain: Not on file    Food insecurity:     Worry: Not on file     Inability: Not on file    Transportation needs:     Medical: Not on file     Non-medical: Not on file   Tobacco Use    Smoking status: Former Smoker     Packs/day: 1.00     Last attempt to quit: 1987     Years since quittin.6    Smokeless tobacco: Never Used   Substance and Sexual Activity    Alcohol use: Yes     Comment: occassional    Drug use: No    Sexual activity: Not on file   Lifestyle    Physical activity:     Days per week: Not on file     Minutes per session: Not on file    Stress: Not on file   Relationships    Social connections:     Talks on phone: Not on file     Gets together: Not on file     Attends Oriental orthodox service: Not on file     Active member of club or organization: Not on file     Attends meetings of clubs or organizations: Not on file     Relationship status: Not on file    Intimate partner violence:     Fear of current or ex partner: Not on file     Emotionally abused: Not on file     Physically abused: Not on file     Forced sexual activity: Not on file   Other Topics Concern    Not on file   Social History Narrative    Not on file         ALLERGIES: Azithromycin; Codeine; Morphine; and Percocet [oxycodone-acetaminophen]    Review of Systems   Constitutional: Negative for activity change, appetite change, fever and unexpected weight change. HENT: Negative for congestion and sore throat. Eyes: Negative for visual disturbance. Respiratory: Negative for cough and shortness of breath. Cardiovascular: Negative for chest pain, palpitations and leg swelling. Gastrointestinal: Negative for abdominal pain, diarrhea, nausea and vomiting. Genitourinary: Negative for dysuria. Musculoskeletal: Negative for arthralgias. Skin: Positive for wound. Negative for rash. Neurological: Negative for weakness and headaches. All other systems reviewed and are negative. There were no vitals filed for this visit. Physical Exam   Constitutional: She appears well-developed and well-nourished. Elderly white female; non smoker   Neck: Normal range of motion. Neck supple. Cardiovascular: Normal rate and regular rhythm. Pulmonary/Chest: Effort normal and breath sounds normal.   Abdominal: Soft. Bowel sounds are normal.   Musculoskeletal: She exhibits tenderness. She exhibits no deformity. 3.5 cm right wrist laceration on the radial side; bleeding is controlled. There is no obvious bony deformity. Good neurovascular sensation. No apparent tendon or nerve injury. Skin: Skin is warm. Psychiatric: She has a normal mood and affect. Nursing note and vitals reviewed. [facility]       Wound Repair  Date/Time: 10/26/2019 2:22 PM  Performed by: NPSupervising provider: Dr. Yelitza Hunt  Preparation: skin prepped with Shur-Clens  Pre-procedure re-eval: Immediately prior to the procedure, the patient was reevaluated and found suitable for the planned procedure and any planned medications. Location details: right wrist  Wound length:2.6 - 7.5 cm (3.5cm jagged laceration)    Anesthesia:  Local Anesthetic: lidocaine 1% with epinephrine  Foreign bodies: no foreign bodies  Irrigation solution: saline  Irrigation method: syringe  Debridement: minimal  Skin closure: gut  Number of sutures: 5  Technique: interrupted  Approximation: close  Dressing: antibiotic ointment and pressure dressing  Patient tolerance: Patient tolerated the procedure well with no immediate complications  My total time at bedside, performing this procedure was 16-30 minutes. Comments: Wound care instructions were reviewed with the patient and her daughter; good neurovascular sensation before and after the procedure. Kacie Hernandez NP            Patient was placed in a nonadhesive dressing over the wound followed by a Velcro wrist splint by the RN for comfort and support; good neurovascular sensation before and after the wound care and splinting.   Encourage close follow-up with hand orthopedic specialist.  Ortho consulted ( Walker/Barbara)and recommended washout, antibiotics, wound closure and close follow-up. Discussed plan of care with Dr. Julia Hernandez. Michelle Villarreal NP  3:09 PM  Patient's results and plan of care have been reviewed with the pt and her daughter. Patient and/or family have verbally conveyed their understanding and agreement of the patient's signs, symptoms, diagnosis, treatment and prognosis and additionally agree to follow up as recommended or return to the Emergency Room should her condition change prior to follow-up. Discharge instructions have also been provided to the patient with some educational information regarding her diagnosis as well a list of reasons why she would want to return to the ER prior to her follow-up appointment should her condition change. Michelle Villarreal NP

## 2021-07-30 ENCOUNTER — HOSPITAL ENCOUNTER (OUTPATIENT)
Age: 76
Setting detail: OUTPATIENT SURGERY
Discharge: HOME | End: 2021-07-30
Attending: SPECIALIST | Admitting: SPECIALIST
Payer: MEDICARE

## 2021-07-30 VITALS
DIASTOLIC BLOOD PRESSURE: 71 MMHG | RESPIRATION RATE: 12 BRPM | HEART RATE: 70 BPM | SYSTOLIC BLOOD PRESSURE: 147 MMHG | HEIGHT: 65 IN | BODY MASS INDEX: 30.19 KG/M2 | OXYGEN SATURATION: 100 % | TEMPERATURE: 97.7 F | WEIGHT: 181.22 LBS

## 2021-07-30 PROCEDURE — 2709999900 HC NON-CHARGEABLE SUPPLY: Performed by: SPECIALIST

## 2021-07-30 PROCEDURE — 74011250636 HC RX REV CODE- 250/636: Performed by: SPECIALIST

## 2021-07-30 PROCEDURE — 88305 TISSUE EXAM BY PATHOLOGIST: CPT

## 2021-07-30 PROCEDURE — 76040000019: Performed by: SPECIALIST

## 2021-07-30 PROCEDURE — 77030013992 HC SNR POLYP ENDOSC BSC -B: Performed by: SPECIALIST

## 2021-07-30 RX ORDER — LANOLIN ALCOHOL/MO/W.PET/CERES
CREAM (GRAM) TOPICAL
COMMUNITY

## 2021-07-30 RX ORDER — FLUMAZENIL 0.1 MG/ML
0.2 INJECTION INTRAVENOUS
Status: DISCONTINUED | OUTPATIENT
Start: 2021-07-30 | End: 2021-07-30 | Stop reason: HOSPADM

## 2021-07-30 RX ORDER — ZINC GLUCONATE 10 MG
LOZENGE ORAL
COMMUNITY

## 2021-07-30 RX ORDER — MIDAZOLAM HYDROCHLORIDE 1 MG/ML
.25-5 INJECTION, SOLUTION INTRAMUSCULAR; INTRAVENOUS AS NEEDED
Status: DISCONTINUED | OUTPATIENT
Start: 2021-07-30 | End: 2021-07-30 | Stop reason: HOSPADM

## 2021-07-30 RX ORDER — FENTANYL CITRATE 50 UG/ML
25 INJECTION, SOLUTION INTRAMUSCULAR; INTRAVENOUS AS NEEDED
Status: DISCONTINUED | OUTPATIENT
Start: 2021-07-30 | End: 2021-07-30 | Stop reason: HOSPADM

## 2021-07-30 RX ORDER — PROMETHAZINE HYDROCHLORIDE 12.5 MG/1
TABLET ORAL
COMMUNITY

## 2021-07-30 RX ORDER — NALOXONE HYDROCHLORIDE 0.4 MG/ML
0.4 INJECTION, SOLUTION INTRAMUSCULAR; INTRAVENOUS; SUBCUTANEOUS
Status: DISCONTINUED | OUTPATIENT
Start: 2021-07-30 | End: 2021-07-30 | Stop reason: HOSPADM

## 2021-07-30 RX ORDER — CHOLECALCIFEROL (VITAMIN D3) 125 MCG
2000 CAPSULE ORAL
COMMUNITY

## 2021-07-30 RX ORDER — SODIUM CHLORIDE 9 MG/ML
50 INJECTION, SOLUTION INTRAVENOUS CONTINUOUS
Status: DISCONTINUED | OUTPATIENT
Start: 2021-07-30 | End: 2021-07-30 | Stop reason: HOSPADM

## 2021-07-30 RX ORDER — DEXTROMETHORPHAN/PSEUDOEPHED 2.5-7.5/.8
1.2 DROPS ORAL
Status: DISCONTINUED | OUTPATIENT
Start: 2021-07-30 | End: 2021-07-30 | Stop reason: HOSPADM

## 2021-07-30 RX ORDER — ONDANSETRON HYDROCHLORIDE 8 MG/1
8 TABLET, FILM COATED ORAL
COMMUNITY

## 2021-07-30 RX ADMIN — SODIUM CHLORIDE 50 ML/HR: 9 INJECTION, SOLUTION INTRAVENOUS at 12:58

## 2021-07-30 NOTE — DISCHARGE INSTRUCTIONS
[address] [facility] GOKUL Avila MD  [phone]      July 30, 2021    Tyaskin Stains  Birthdate: [birth year]    COLONOSCOPY DISCHARGE INSTRUCTIONS    If there is redness at IV site you should apply warm compress to area. If redness or soreness persist contact Dr. Christiano Avila' or your primary care doctor. There may be a slight amount of blood passed from the rectum. Gaseous discomfort may develop, but walking, belching will help relieve this. You may not operate a vehicle for 12 hours  You may not operate machinery or dangerous appliances for rest of today  You may not drink alcoholic beverages for 12 hours  Avoid making any critical decisions for 24 hours    DIET:  You may resume your normal diet, but some patients find that heavy or large meals may lead to indigestion or vomiting. I suggest a light meal as first food intake. MEDICATIONS:  The use of some over-the-counter pain medication may lead to bleeding after colon biopsies or polyp removal.  Tylenol (also called acetaminophen) is safe to take even if you have had colonoscopy with polyp removal.  Based on the procedure you had today you may not safely take aspirin or aspirin-like products for the next ten (10) days. Remember that Tylenol (also called acetaminophen) is safe to take after colonoscopy even if you have had biopsies or polyps removed. ACTIVITY:  You may resume your normal household activities, but it is recommended that you spend the remainder of the day resting -  avoid any strenuous activity. CALL DR. Travis Kate' OFFICE IF:  Increasing pain, nausea, vomiting  Abdominal distension (swelling)  Significant new or increased bleeding (oral or rectal)  Fever/Chills  Chest pain/shortness of breath                       Additional instructions:   No aspirin 10 days. We found and removed three polyps today.   I'll contact you with results by letter in about a week. It was an honor to be your doctor today. Please let me or my office staff know if you have any feedback about today's procedure. Jonny Sapp MD    Colonoscopy saves lives, and can prevent colon cancer. Everyone aged 48 or older needs colonoscopy.   Tell your family and friends: get the test!

## 2021-07-30 NOTE — PERIOP NOTES
Patient received no sedation for procedure. Patient's ABD remains soft and non-tender post procedure. Pt has no complaints at this time and tolerated the procedure well. Patient taken to post-recovery. Post-recovery report given to Dania Morgan RN and Diana Arauz RN. Endoscope was pre-cleaned at bedside immediately following procedure by Antisha.

## 2021-07-30 NOTE — INTERVAL H&P NOTE
Pre-Endoscopy H&P Update  Chief complaint/HPI/ROS:  The indication for the procedure, the patient's history and the patient's current medications are reviewed prior to the procedure and that data is reported on the H&P to which this document is attached. Any significant complaints with regard to organ systems will be noted, and if not mentioned then a review of systems is not contributory. Past Medical History:   Diagnosis Date    Asthma     Cancer ([facility] [address]     peritoneal cavity. Cardiac rhythm disorder or disturbance or change     MVP and heart murmur    Left upper quadrant pain 2013    Mitral valve prolapse       Past Surgical History:   Procedure Laterality Date    COLONOSCOPY N/A 2018    COLONOSCOPY performed by Madonna Griffin MD at [address] GASTRIC BYPASS  [phone]    HX GYN      uterine fibroids removed before hysterectomy    HX HEART CATHETERIZATION      negative    HX OTHER SURGICAL      left middle finger cut nerve and repaired    HX PARTIAL HYSTERECTOMY       Social   Social History     Tobacco Use    Smoking status: Former Smoker     Packs/day: 1.00     Quit date: 1987     Years since quittin.4    Smokeless tobacco: Never Used   Substance Use Topics    Alcohol use: Yes     Comment: occassional      Family History   Problem Relation Age of Onset    Cancer Mother     Heart Disease Mother     Hypertension Mother     Breast Cancer Mother     Cancer Father         leukemia    Cancer Sister         -breast    Breast Cancer Sister     Thyroid Disease Sister         precancer      Allergies   Allergen Reactions    Azithromycin Other (comments)     Makes pt feel worse, run fever, get shaky. Codeine Itching    Morphine Itching    Other Medication Other (comments)     Z-pack. Caused fever. Percocet [Oxycodone-Acetaminophen] Itching     Cannot take pill form      Prior to Admission Medications   Prescriptions Last Dose Informant Patient Reported? Taking? SERTRALINE HCL (ZOLOFT PO) Not Taking at Unknown time  Yes No   Sig: Take  by mouth. Patient not taking: Reported on 7/30/2021   albuterol (PROVENTIL HFA, VENTOLIN HFA, PROAIR HFA) 90 mcg/actuation inhaler Unknown at Unknown time  Yes No   Sig: Take 2 Puffs by inhalation every six (6) hours as needed for Wheezing. cholecalciferol, vitamin D3, (Vitamin D3) 50 mcg (2,000 unit) tab 7/29/2021 at Unknown time  Yes Yes   Sig: Take 2,000 Units by mouth five (5) days a week. ferrous sulfate (Iron) 325 mg (65 mg iron) tablet 7/29/2021 at Unknown time  Yes Yes   Sig: Take  by mouth Daily (before breakfast). OTC. DOSAGE UNKNOWN .   gabapentin (NEURONTIN) 300 mg capsule 7/30/2021 at Unknown time  Yes Yes   Sig: Take 300 mg by mouth three (3) times daily. magnesium 250 mg tab 7/29/2021 at Unknown time  Yes Yes   Sig: Take  by mouth. Dosage unknown. OTC MAGNESIUM. meloxicam (MOBIC) 7.5 mg tablet Not Taking at Unknown time  Yes No   Sig: Take 7.5 mg by mouth daily. Patient not taking: Reported on 7/30/2021   metaxalone (SKELAXIN) 800 mg tablet Not Taking at Unknown time  Yes No   Sig: Take 800 mg by mouth three (3) times daily. Patient not taking: Reported on 7/30/2021   metoprolol (LOPRESSOR) 50 mg tablet Not Taking at Unknown time  Yes No   Sig: Take 50 mg by mouth daily. Patient not taking: Reported on 7/30/2021   olaparib Celena Frock) 150 mg tablet 7/30/2021 at Unknown time  Yes Yes   Sig: Take 300 mg by mouth two (2) times a day. 300mg a.m. and 300mg p.m.   ondansetron hcl (Zofran) 8 mg tablet 7/23/2021 at Unknown time  Yes Yes   Sig: Take 8 mg by mouth. 1 TAB PER DAY. promethazine (PHENERGAN) 12.5 mg tablet 7/30/2021 at Unknown time  Yes Yes   Sig: Take  by mouth every six (6) hours as needed for Nausea. triamterene-hydrochlorothiazide (DYAZIDE) 37.5-25 mg per capsule Not Taking at Unknown time  Yes No   Sig: Take  by mouth daily.    Patient not taking: Reported on 7/30/2021      Facility-Administered Medications: None       PHYSICAL EXAM:  The patient is examined immediately prior to the procedure. Visit Vitals  BP (!) 148/70   Pulse 78   Temp 97.7 °F (36.5 °C)   Resp 16   Ht 5' 5\" (1.651 m)   Wt 82.2 kg (181 lb 3.5 oz)   SpO2 91%   Breastfeeding No   BMI 30.16 kg/m²     Gen: Appears comfortable, no distress. Pulm: comfortable respirations with no abnormal audible breath sounds  HEART: well perfused, no abnormal audible heart sounds  GI: abdomen flat. PLAN:  Informed consent discussion held, patient afforded an opportunity to ask questions and all questions answered. After being advised of the risks, benefits, and alternatives, the patient requested that we proceed and indicated so on a written consent form. Will proceed with procedure as planned.   Edward Delgado MD

## 2021-07-30 NOTE — PERIOP NOTES
Initial RN admission and assessment performed and documented in Endoscopy navigator. All procedural vital signs, airway assessment, and level of consciousness information monitored and recorded by endoscopy nurse. See flowsheet.

## 2021-07-30 NOTE — PROGRESS NOTES
Patient in post.  Patient received no anesthesia/ patient walked out to meet her daughter who drove her home.

## 2021-07-30 NOTE — PROCEDURES
[address] [facility] GOKUL Tolentino MD  [phone]      2021    Colonoscopy Procedure Note  Debra Daily  :  [address]  Ana Medical Record Number: [id number]    Indications:     Personal history of colon polyps (screening only)  PCP:  Lizy Proctor MD  Anesthesia/Sedation: Conscious Sedation/Moderate Sedation/GETA, see notes  Endoscopist:  Dr. Chantelle Villafana  Complications:  None  Estimated Blood Loss:  None    Permit:  The indications, risks, benefits and alternatives were reviewed with the patient or their decision maker who was provided an opportunity to ask questions and all questions were answered. The specific risks of colonoscopy with conscious sedation were reviewed, including but not limited to anesthetic complication, bleeding, adverse drug reaction, missed lesion, infection, IV site reactions, and intestinal perforation which would lead to the need for surgical repair. Alternatives to colonoscopy including radiographic imaging, observation without testing, or laboratory testing were reviewed including the limitations of those alternatives. After considering the options and having all their questions answered, the patient or their decision maker provided both verbal and written consent to proceed. Procedure in Detail:  After obtaining informed consent, positioning of the patient in the left lateral decubitus position, and conduction of a pre-procedure pause or \"time out\" the endoscope was introduced into the anus and advanced to the cecum, which was identified by the ileocecal valve and appendiceal orifice. The quality of the colonic preparation was good. A careful inspection was made as the colonoscope was withdrawn, findings and interventions are described below. Findings: Three polyps removed with cold snare, retrieved, hemostasis confirmed.   Ascending 5mm  Sigmoid 6mm, 7mm    Specimens:    See above    Complications:   None; patient tolerated the procedure well. Impression:  Colon polyps (three). Recommendations:     - Await pathology. Thank you for entrusting me with this patient's care. Please do not hesitate to contact me with any questions or if I can be of assistance with any of your other patients' GI needs. Signed By: Ben Henley MD                        July 30, 2021      Surgical assistant none. Implants none unless specified.

## 2021-07-30 NOTE — H&P
70 y.o. female for open access colonoscopy for screening   Additional data for completion of the targeted pre-endoscopy H&P will be provided under 'H&P interval notes'. Please see that document which will be attached to this. Amanda Pearce MD  Last colonoscopy 2018 three TA - for repeat.

## 2021-07-30 NOTE — PROGRESS NOTES
Endoscopy discharge instructions have been reviewed and given to patient. The patient verbalized understanding and acceptance of instructions. Dr. Yoanna Tracy  discussed with patient's daughter  procedure findings and next steps.

## 2021-07-30 NOTE — PROGRESS NOTES
Raji Dodd  [birth year]  [id number]    Situation:  Verbal report received from: Jace Benitez RN   Procedure: Procedure(s):  COLONOSCOPY (request con-sed)  ENDOSCOPIC POLYPECTOMY    Background:    Preoperative diagnosis: PERSONAL HX OF COLYN POLYPS  Postoperative diagnosis: 1- Ascending colon polyp. 2- Sigmoid colon polyp x2. :  Dr. Mary Uribe   Assistant(s): Endoscopy Technician-1: Mao Cheung  Endoscopy RN-1: Adan Cantrell    Specimens:   ID Type Source Tests Collected by Time Destination   1 : Ascending colon polyp  Preservative Colon, Ascending  Toshia Donato MD 7/30/2021 1312 Pathology   2 : Sigmoid colon polyp x2. Preservative Sigmoid  Toshia Donato MD 7/30/2021 1316 Pathology     H. Pylori  no    Assessment:  Intra-procedure medications       Anesthesia gave intra-procedure sedation and medications, see anesthesia flow sheet \no anesthesia given    Intravenous fluids: NS@ KVO     Vital signs stable   yes    Abdominal assessment: round and soft   yes    Recommendation:  Discharge patient per MD order  yes.   Return to floor  outpatient  Family or Friend   Daughter   Permission to share finding with family or friend yes

## 2022-09-09 ENCOUNTER — APPOINTMENT (OUTPATIENT)
Dept: GENERAL RADIOLOGY | Age: 77
End: 2022-09-09
Attending: NURSE PRACTITIONER
Payer: MEDICARE

## 2022-09-09 ENCOUNTER — HOSPITAL ENCOUNTER (EMERGENCY)
Age: 77
Discharge: HOME | End: 2022-09-09
Attending: STUDENT IN AN ORGANIZED HEALTH CARE EDUCATION/TRAINING PROGRAM
Payer: MEDICARE

## 2022-09-09 VITALS
RESPIRATION RATE: 16 BRPM | TEMPERATURE: 98 F | OXYGEN SATURATION: 100 % | WEIGHT: 170 LBS | BODY MASS INDEX: 28.32 KG/M2 | DIASTOLIC BLOOD PRESSURE: 73 MMHG | SYSTOLIC BLOOD PRESSURE: 133 MMHG | HEIGHT: 65 IN | HEART RATE: 91 BPM

## 2022-09-09 DIAGNOSIS — S82.832A OTHER CLOSED FRACTURE OF DISTAL END OF LEFT FIBULA, INITIAL ENCOUNTER: Primary | ICD-10-CM

## 2022-09-09 DIAGNOSIS — S99.912A INJURY OF LEFT ANKLE, INITIAL ENCOUNTER: ICD-10-CM

## 2022-09-09 PROCEDURE — 73610 X-RAY EXAM OF ANKLE: CPT

## 2022-09-09 PROCEDURE — 99283 EMERGENCY DEPT VISIT LOW MDM: CPT

## 2022-09-09 RX ORDER — TRAMADOL HYDROCHLORIDE 50 MG/1
50 TABLET ORAL
Status: DISCONTINUED | OUTPATIENT
Start: 2022-09-09 | End: 2022-09-10 | Stop reason: HOSPADM

## 2022-09-10 NOTE — ED NOTES
Shilpi applied by Balwinder Arauz NP    Discharge paperwork provided and reviewed by provider    Discharged by provider

## 2022-09-10 NOTE — ED PROVIDER NOTES
JOSE Ling is a 78 y.o. female with Hx of asthma, peritoneal cavity cancer, MVP who presents ambulatory by herself to [facility] ED with cc of L ankle pain. Patient reports that she rolled her left ankle while wearing wedge shoes today. States that she had immediate pain on the lateral aspect of her ankle with associated swelling and pain with weightbearing. She took a Tramadol at 1 PM today. She has iced her ankle and elevated it with no improvement. She does not use any assistive devices to ambulate normally, but does have a cane and walker at home. She denies any head or neck or back injury from her fall. PCP: Neisha Washington MD    There are no other complaints, changes or physical findings at this time. Denies tobacco/ ETOH/ substance abuse. Past Medical History:   Diagnosis Date    Asthma     Cancer ([facility] [address]     peritoneal cavity.      Cardiac rhythm disorder or disturbance or change     MVP and heart murmur    Left upper quadrant pain 2013    Mitral valve prolapse        Past Surgical History:   Procedure Laterality Date    COLONOSCOPY N/A 2018    COLONOSCOPY performed by Radha Rodriguez MD at [address] N/A 2021    COLONOSCOPY (request con-sed) performed by Emeterio Saucedo MD at [facility] GASTRIC BYPASS  [phone]    HX GYN      uterine fibroids removed before hysterectomy    HX HEART CATHETERIZATION      negative    HX OTHER SURGICAL      left middle finger cut nerve and repaired    HX PARTIAL HYSTERECTOMY           Family History:   Problem Relation Age of Onset    Cancer Mother     Heart Disease Mother     Hypertension Mother     Breast Cancer Mother     Cancer Father         leukemia    Cancer Sister         -breast    Breast Cancer Sister     Thyroid Disease Sister         precancer       Social History     Socioeconomic History    Marital status:      Spouse name: Not on file    Number of children: Not on file    Years of education: Not on file    Highest education level: Not on file   Occupational History    Not on file   Tobacco Use    Smoking status: Former     Packs/day: 1.00     Types: Cigarettes     Quit date: 1987     Years since quittin.5    Smokeless tobacco: Never   Substance and Sexual Activity    Alcohol use: Yes     Comment: occassional    Drug use: No    Sexual activity: Not on file   Other Topics Concern    Not on file   Social History Narrative    Not on file     Social Determinants of Health     Financial Resource Strain: Not on file   Food Insecurity: Not on file   Transportation Needs: Not on file   Physical Activity: Not on file   Stress: Not on file   Social Connections: Not on file   Intimate Partner Violence: Not on file   Housing Stability: Not on file         ALLERGIES: Augmentin [amoxicillin-pot clavulanate], Azithromycin, Codeine, Morphine, Other medication, and Percocet [oxycodone-acetaminophen]    Review of Systems   Constitutional:  Negative for activity change, appetite change, chills and fever. HENT:  Negative for congestion, rhinorrhea and sore throat. Eyes:  Negative for visual disturbance. Respiratory:  Negative for cough and shortness of breath. Cardiovascular:  Negative for chest pain. Gastrointestinal:  Negative for abdominal pain, diarrhea, nausea and vomiting. Genitourinary:  Negative for dysuria, flank pain, frequency and urgency. Musculoskeletal:  Positive for arthralgias, joint swelling and myalgias. Negative for back pain and neck pain. Skin:  Negative for color change and rash. Neurological:  Negative for dizziness, weakness and headaches. All other systems reviewed and are negative. Vitals:    22 2128   BP: 133/73   Pulse: 91   Resp: 16   Temp: 98 °F (36.7 °C)   SpO2: 100%   Weight: 77.1 kg (170 lb)   Height: 5' 5\" (1.651 m)            Physical Exam  Vitals and nursing note reviewed.    Constitutional:       General: She is not in acute distress. Appearance: She is well-developed. HENT:      Head: Normocephalic and atraumatic. Right Ear: External ear normal.      Left Ear: External ear normal.   Eyes:      Conjunctiva/sclera: Conjunctivae normal.      Pupils: Pupils are equal, round, and reactive to light. Cardiovascular:      Rate and Rhythm: Normal rate and regular rhythm. Heart sounds: Normal heart sounds. Pulmonary:      Effort: Pulmonary effort is normal.      Breath sounds: Normal breath sounds. Musculoskeletal:         General: Swelling (L ankle), tenderness (lateral) and signs of injury present. No deformity. Cervical back: Normal range of motion and neck supple. Comments: L ankle      Skin:     General: Skin is warm and dry. Neurological:      Mental Status: She is alert and oriented to person, place, and time. Psychiatric:         Behavior: Behavior normal.         Thought Content: Thought content normal.         Judgment: Judgment normal.        MDM  Number of Diagnoses or Management Options  Injury of left ankle, initial encounter  Other closed fracture of distal end of left fibula, initial encounter  Diagnosis management comments: Ddx: sprain, fx, strain     Patient presents with localized pain and swelling to her left ankle after she rolled her ankle today while wearing wedge shoes. She has a nondisplaced fibular fracture on x-ray. She was placed in a walking boot and advised to touch weight bear,  ice and elevate her ankle, and follow-up with orthopedics. She was encouraged to call orthopedics on Monday for an appointment. She has prescriptions for Tramadol at home. Reasons to return to ED were provided/ reviewed at time of discharge.         Amount and/or Complexity of Data Reviewed  Tests in the radiology section of CPT®: ordered and reviewed  Review and summarize past medical records: yes  Discuss the patient with other providers: yes           Reduction of Joint    Date/Time: 9/11/2022 1:44 AM  Performed by: Rafael Garza NP  Authorized by: Mayra Liang DO     Consent:     Consent obtained:  Verbal    Consent given by:  Patient    Risks, benefits, and alternatives were discussed: yes    Injury:     Injury location:  Lower leg    Lower leg injury location:  L lower leg    Lower leg fracture type comment:  Distal fibula  Pre-procedure details:     Distal neurologic exam:  Normal    Distal perfusion: distal pulses strong      Range of motion: normal    Procedure details:     Manipulation performed: no      Skin traction used: no      Supplies used:  Prefabricated splint  Post-procedure details:     Distal neurologic exam:  Normal    Distal perfusion: distal pulses strong      Range of motion: normal      Procedure completion:  Tolerated well, no immediate complications    LABORATORY TESTS:  No results found for this or any previous visit (from the past 12 hour(s)). IMAGING RESULTS:  XR ANKLE LT MIN 3 V   Final Result   Acute nondisplaced distal fibular fracture          MEDICATIONS GIVEN:  Medications   traMADoL (ULTRAM) tablet 50 mg (has no administration in time range)       IMPRESSION:  1. Other closed fracture of distal end of left fibula, initial encounter    2. Injury of left ankle, initial encounter        PLAN:  1. Discharge Medication List as of 9/9/2022 10:21 PM        2. Follow-up Information       Follow up With Specialties Details Why Contact Info    [facility] EMERGENCY DEPT Emergency Medicine Go to  As needed, If symptoms worsen [address] [place] [address]  [phone]    [facility]  Schedule an appointment as soon as possible for a visit   [address] [place] [address]  [phone]          3. Return to ED if worse                      ------------------------------  I reviewed with the AMRIT's the medical history and the findings on the physical examination.   I discussed with the AMRIT the patient's diagnosis and concur with the plan. In summary, this is a 30-year-old female with the unfortunate history of peritoneal cancer, presenting for a left ankle injury after tripping and rolling it. Patient is neurovascularly intact on examination but has significant difficulty ambulating. X-ray showed distal fibula fracture. Given this, she was given a walking boot as she would prefer to avoid splints. Advise close follow-up with orthopedics. Prescriptions for analgesia.   Counseled regarding R ICE    Return precautions of focal neurologic weakness or numbness or muscle pain, pallor, paresthesias  Marine Mary Kay, DO

## 2022-09-10 NOTE — ED NOTES
Problem: Infant Inpatient Plan of Care  Goal: Plan of Care Review  Outcome: Ongoing, Progressing  Flowsheets (Taken 2019 2240)  Care Plan Reviewed With: other (see comments) (no parental contact so far this shift)  Infant remains in an isolette with stable axillary temperatures.  VSS on RA.  Cue-based feeding & contact isolation protocol in progress.  No parental contact so far this shift.  Elinor Barnes RN 2019     Patient to radiology from triage, patient declined pain medication as she drove herself

## 2022-09-10 NOTE — DISCHARGE INSTRUCTIONS
Call orthopedics on Monday to set up a follow up appointment   Ice/ elevate your leg, only weight bear to use bathroom and for mild activities. Do not wear boot to bed or to bathe. Use walker to help with ambulation.    Alternate Tylenol or Motrin for pain   Return to ER for any worsening or worrisome symptoms

## 2022-09-10 NOTE — ED TRIAGE NOTES
Patient arrives with complaints of left ankle pain after rolling her ankle while wearing wedge shoes     Patient took tramadol at 1330    Ice pack provided in triage

## 2022-12-23 ENCOUNTER — HOSPITAL ENCOUNTER (OUTPATIENT)
Age: 77
Setting detail: OUTPATIENT SURGERY
Discharge: HOME | End: 2022-12-23
Attending: SPECIALIST | Admitting: SPECIALIST
Payer: MEDICARE

## 2022-12-23 ENCOUNTER — ANESTHESIA (OUTPATIENT)
Dept: ENDOSCOPY | Age: 77
End: 2022-12-23
Payer: MEDICARE

## 2022-12-23 ENCOUNTER — ANESTHESIA EVENT (OUTPATIENT)
Dept: ENDOSCOPY | Age: 77
End: 2022-12-23
Payer: MEDICARE

## 2022-12-23 VITALS
SYSTOLIC BLOOD PRESSURE: 110 MMHG | HEIGHT: 66 IN | DIASTOLIC BLOOD PRESSURE: 65 MMHG | BODY MASS INDEX: 26.54 KG/M2 | OXYGEN SATURATION: 100 % | TEMPERATURE: 97.6 F | RESPIRATION RATE: 23 BRPM | WEIGHT: 165.12 LBS | HEART RATE: 74 BPM

## 2022-12-23 PROCEDURE — 74011250636 HC RX REV CODE- 250/636: Performed by: NURSE ANESTHETIST, CERTIFIED REGISTERED

## 2022-12-23 PROCEDURE — 88305 TISSUE EXAM BY PATHOLOGIST: CPT

## 2022-12-23 PROCEDURE — 76040000019: Performed by: SPECIALIST

## 2022-12-23 PROCEDURE — 77030013992 HC SNR POLYP ENDOSC BSC -B: Performed by: SPECIALIST

## 2022-12-23 PROCEDURE — 76060000031 HC ANESTHESIA FIRST 0.5 HR: Performed by: SPECIALIST

## 2022-12-23 PROCEDURE — 2709999900 HC NON-CHARGEABLE SUPPLY: Performed by: SPECIALIST

## 2022-12-23 RX ORDER — HYDROCODONE BITARTRATE AND IBUPROFEN 5; 200 MG/1; MG/1
1 TABLET ORAL
COMMUNITY

## 2022-12-23 RX ORDER — FLUMAZENIL 0.1 MG/ML
0.2 INJECTION INTRAVENOUS
Status: DISCONTINUED | OUTPATIENT
Start: 2022-12-23 | End: 2022-12-23 | Stop reason: HOSPADM

## 2022-12-23 RX ORDER — NALOXONE HYDROCHLORIDE 0.4 MG/ML
0.4 INJECTION, SOLUTION INTRAMUSCULAR; INTRAVENOUS; SUBCUTANEOUS
Status: DISCONTINUED | OUTPATIENT
Start: 2022-12-23 | End: 2022-12-23 | Stop reason: HOSPADM

## 2022-12-23 RX ORDER — PROPOFOL 10 MG/ML
INJECTION, EMULSION INTRAVENOUS AS NEEDED
Status: DISCONTINUED | OUTPATIENT
Start: 2022-12-23 | End: 2022-12-23 | Stop reason: HOSPADM

## 2022-12-23 RX ORDER — DEXTROMETHORPHAN/PSEUDOEPHED 2.5-7.5/.8
1.2 DROPS ORAL
Status: DISCONTINUED | OUTPATIENT
Start: 2022-12-23 | End: 2022-12-23 | Stop reason: HOSPADM

## 2022-12-23 RX ORDER — SODIUM CHLORIDE 9 MG/ML
50 INJECTION, SOLUTION INTRAVENOUS CONTINUOUS
Status: DISCONTINUED | OUTPATIENT
Start: 2022-12-23 | End: 2022-12-23 | Stop reason: HOSPADM

## 2022-12-23 RX ADMIN — PROPOFOL 400 MG: 10 INJECTION, EMULSION INTRAVENOUS at 10:14

## 2022-12-23 RX ADMIN — PROPOFOL 170 MG: 10 INJECTION, EMULSION INTRAVENOUS at 09:51

## 2022-12-23 NOTE — PROCEDURES
[address] [facility] GOKUL Rodriguez MD  [phone]      2022    Esophagogastroduodenoscopy & Colonoscopy Procedure Note  Eusebio Caruso  :   [facility] Medical Record Number: [id number]      Indications:    Vomiting, persitent of unclear etilogy Personal history of colon polyps (screening only)   Referring Physician:  Gabriela Pacheco MD  Anesthesia/Sedation: Conscious Sedation/Moderate Sedation/MAC  Endoscopist:  Dr. Jose Saba  Complications:  None  Estimated Blood Loss:  None    Permit:  The indications, risks, benefits and alternatives were reviewed with the patient or their decision maker who was provided an opportunity to ask questions and all questions were answered. The specific risks of esophagogastroduodenoscopy with conscious sedation were reviewed, including but not limited to anesthetic complication, bleeding, adverse drug reaction, missed lesion, infection, IV site reactions, and intestinal perforation which would lead to the need for surgical repair. Alternatives to EGD and colonoscopy including radiographic imaging, observation without testing, or laboratory testing were reviewed as well as the limitations of those alternatives discussed. After considering the options and having all their questions answered, the patient or their decision maker provided both verbal and written consent to proceed. -----------EGD------------   Procedure in Detail:  After obtaining informed consent, positioning of the patient in the left lateral decubitus position, and conduction of a pre-procedure pause or \"time out\" the endoscope was introduced into the mouth and advanced to the duodenum. A careful inspection was made, and findings or interventions are described below. Findings:   Esophagus:normal  Stomach:  There has been gastric bypass but also there appears to be a gastro-gastric fistula. One lumen leads to an area of afferent and efferent jejunum. We examined the jejunal limb to the depth of the endoscope without finding jejunal abnormality. We extracted and then entered the gastro-gastric fistula, noted gastric anatomy; body and antrum. We passed the antrum to the duodenum and examined through the 4th portion without discovery of abnormality. Duodenum/jejunum:  As above, normal.        ----------Colonoscopy-----------    Procedure in Detail:  After obtaining informed consent, positioning of the patient in the left lateral decubitus position, and conduction of a pre-procedure pause or \"time out\" the endoscope was introduced into the anus and advanced to the terminal ileum. The quality of the colonic preparation was good. A careful inspection was made as the colonoscope was withdrawn, findings and interventions are described below. Findings:   Normal terminal ileum noted on slide-by views, deep intubation not accomplished due to looping, but no mucosal abnormality appreciated. Three transverse colon polyps all <10mm, one pedunculated rectal polyp 10mm and one sessile polyp in rectum 5mm. All taken with snare, the largest hot the balance cold. All samples retrieved and hemostasis confirmed. ------------------------------  Specimens:    See above    Complications:   None; patient tolerated the procedure well. 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 Colonoscopy:  Colon polyps. Recommendations:     - Await pathology.  -Upper GI with small bowel follow through prior to consideration for pillcam study. Need to better assess her JJ anastomosis. Thank you for entrusting me with this patient's care. Please do not hesitate to contact me with any questions or if I can be of assistance with any of your other patients' GI needs.     Signed By: Tammy Negron MD                        December 23, 2022    Surgical assistant none. Implants none unless specified.

## 2022-12-23 NOTE — ANESTHESIA PREPROCEDURE EVALUATION
Anesthetic History   No history of anesthetic complications            Review of Systems / Medical History  Patient summary reviewed and nursing notes reviewed    Pulmonary            Asthma : well controlled       Neuro/Psych   Within defined limits           Cardiovascular    Hypertension              Exercise tolerance: >4 METS  Comments:       GI/Hepatic/Renal     GERD           Endo/Other        Cancer    Comments: Ovarian cancer: local metastasis Other Findings              Physical Exam    Airway  Mallampati: I    Neck ROM: normal range of motion   Mouth opening: Normal     Cardiovascular    Rhythm: regular  Rate: normal         Dental    Dentition: Lower partial plate and Full upper dentures  Comments: Missing multiple teeth, pt reports that none are loose   Pulmonary  Breath sounds clear to auscultation               Abdominal         Other Findings            Anesthetic Plan    ASA: 2  Anesthesia type: MAC            Anesthetic plan and risks discussed with: Patient      Informed consent obtained.

## 2022-12-23 NOTE — PROGRESS NOTES
[facility]  [birth year]  [id number]    Situation:  Verbal report received from: Isabel DENNIS   Procedure: Procedure(s):  ESOPHAGOGASTRODUODENOSCOPY (EGD)  COLONOSCOPY  ENDOSCOPIC POLYPECTOMY    Background:    Preoperative diagnosis: abdominal pain  dysphagia  Postoperative diagnosis: S/P Gastric Byoass  Gastro Gastric Fistula  Colon Polyps x5    :  Dr. Pedro Acevedo  Assistant(s): Endoscopy Technician-1: Enoc Gorman  Endoscopy RN-1: Kinsey Chew RN    Specimens:   ID Type Source Tests Collected by Time Destination   1 : Transverse Colon Polyps x 3 Preservative   Natalie Greer MD 12/23/2022 1001 Pathology   2 : Rectal Polyp x2 Preservative   Natalie Greer MD 12/23/2022 1012 Pathology     H. Pylori  no    Assessment:    Anesthesia gave intra-procedure sedation and medications, see anesthesia flow sheet no    Intravenous fluids: NS@ KVO     Vital signs stable     Abdominal assessment: round and soft     Recommendation:  Discharge patient per MD order.   Return to floor  Family or Friend   Permission to share finding with family or friend yes

## 2022-12-23 NOTE — PROCEDURES
[address] [facility] GOKUL Acevedo MD  [phone]      December 23, 2022    [facility]  Birthdate: [birth year]    COLONOSCOPY DISCHARGE INSTRUCTIONS    If there is redness at IV site you should apply warm compress to area. If redness or soreness persist contact Dr. Pedro Acevedo' or your primary care doctor. There may be a slight amount of blood passed from the rectum. Gaseous discomfort may develop, but walking, belching will help relieve this. You may not operate a vehicle for 12 hours  You may not operate machinery or dangerous appliances for rest of today  You may not drink alcoholic beverages for 12 hours  Avoid making any critical decisions for 24 hours    DIET:  You may resume your normal diet, but some patients find that heavy or large meals may lead to indigestion or vomiting. I suggest a light meal as first food intake. MEDICATIONS:  The use of some over-the-counter pain medication may lead to bleeding after colon biopsies or polyp removal.  Tylenol (also called acetaminophen) is safe to take even if you have had colonoscopy with polyp removal.  Based on the procedure you had today you may not safely take aspirin or aspirin-like products for the next ten (10) days. Remember that Tylenol (also called acetaminophen) is safe to take after colonoscopy even if you have had biopsies or polyps removed. ACTIVITY:  You may resume your normal household activities, but it is recommended that you spend the remainder of the day resting -  avoid any strenuous activity. CALL DR. Adwoa Escobedo' OFFICE IF:  Increasing pain, nausea, vomiting  Abdominal distension (swelling)  Significant new or increased bleeding (oral or rectal)  Fever/Chills  Chest pain/shortness of breath                       Additional instructions: We found several polyps but no obvious explanation for your vomiting.   I did discover that the gastric bypass has 'failed' in that the 'regular' part of the stomach is now connected. Whether that is contributing to your symptoms is not clear. I will get a different type of xray test to see. I'll contact you with the polyp results in 10-14 days. It was an honor to be your doctor today. Please let me or my office staff know if you have any feedback about today's procedure. Addi Urias MD    Colonoscopy saves lives, and can prevent colon cancer. Everyone aged 48 or older needs colonoscopy.   Tell your family and friends: get the test!

## 2022-12-23 NOTE — PERIOP NOTES
Report from Isi Wallace 48, CRNA, see anesthesia record. ABD remains soft and non-tender post procedure. Pt has no complaints at this time and tolerated the procedure well. Endoscope was pre-cleaned at bedside immediately following procedure by BALBINA AT [facility].

## 2022-12-23 NOTE — ANESTHESIA POSTPROCEDURE EVALUATION
Procedure(s):  ESOPHAGOGASTRODUODENOSCOPY (EGD)  COLONOSCOPY  ENDOSCOPIC POLYPECTOMY. MAC    Anesthesia Post Evaluation      Multimodal analgesia: multimodal analgesia not used between 6 hours prior to anesthesia start to PACU discharge  Patient location during evaluation: PACU  Patient participation: complete - patient participated  Level of consciousness: awake and alert  Pain score: 0  Pain management: adequate  Airway patency: patent  Anesthetic complications: no  Cardiovascular status: hemodynamically stable and acceptable  Respiratory status: acceptable  Hydration status: acceptable  Comments: Patient seen and evaluated; no concerns. Post anesthesia nausea and vomiting:  none      INITIAL Post-op Vital signs:   Vitals Value Taken Time   /55 12/23/22 1036   Temp 36.4 °C (97.6 °F) 12/23/22 1025   Pulse 74 12/23/22 1040   Resp 23 12/23/22 1040   SpO2 100 % 12/23/22 1040   Vitals shown include unvalidated device data.

## 2022-12-23 NOTE — DISCHARGE INSTRUCTIONS
[address] [facility] GOKUL Purcell MD  [phone]      December 23, 2022    Rhea Vaz  Birthdate: [birth year]    COLONOSCOPY DISCHARGE INSTRUCTIONS    If there is redness at IV site you should apply warm compress to area. If redness or soreness persist contact Dr. Yue Purcell' or your primary care doctor. There may be a slight amount of blood passed from the rectum. Gaseous discomfort may develop, but walking, belching will help relieve this. You may not operate a vehicle for 12 hours  You may not operate machinery or dangerous appliances for rest of today  You may not drink alcoholic beverages for 12 hours  Avoid making any critical decisions for 24 hours    DIET:  You may resume your normal diet, but some patients find that heavy or large meals may lead to indigestion or vomiting. I suggest a light meal as first food intake. MEDICATIONS:  The use of some over-the-counter pain medication may lead to bleeding after colon biopsies or polyp removal.  Tylenol (also called acetaminophen) is safe to take even if you have had colonoscopy with polyp removal.  Based on the procedure you had today you may not safely take aspirin or aspirin-like products for the next ten (10) days. Remember that Tylenol (also called acetaminophen) is safe to take after colonoscopy even if you have had biopsies or polyps removed. ACTIVITY:  You may resume your normal household activities, but it is recommended that you spend the remainder of the day resting -  avoid any strenuous activity. CALL DR. Bee Caban' OFFICE IF:  Increasing pain, nausea, vomiting  Abdominal distension (swelling)  Significant new or increased bleeding (oral or rectal)  Fever/Chills  Chest pain/shortness of breath                       Additional instructions: We found several polyps but no obvious explanation for your vomiting.   I did discover that the gastric bypass has 'failed' in that the 'regular' part of the stomach is now connected. Whether that is contributing to your symptoms is not clear. I will get a different type of xray test to see. I'll contact you with the polyp results in 10-14 days. It was an honor to be your doctor today. Please let me or my office staff know if you have any feedback about today's procedure. Elin Mcclure MD    Colonoscopy saves lives, and can prevent colon cancer. Everyone aged 48 or older needs colonoscopy.   Tell your family and friends: get the test!

## 2022-12-23 NOTE — PROGRESS NOTES
Endoscopy discharge instructions have been reviewed and given to patient. The patient verbalized understanding and acceptance of instructions. Dr. Jenna Petit discussed with patient procedure findings and next steps.

## 2022-12-23 NOTE — INTERVAL H&P NOTE
[address] [facility] GOKUL Kaur MD  [phone]      December 23, 2022    Annalise Ramirez  Birthdate: [birth year]    COLONOSCOPY DISCHARGE INSTRUCTIONS    If there is redness at IV site you should apply warm compress to area. If redness or soreness persist contact Dr. Morteza Kaur' or your primary care doctor. There may be a slight amount of blood passed from the rectum. Gaseous discomfort may develop, but walking, belching will help relieve this. You may not operate a vehicle for 12 hours  You may not operate machinery or dangerous appliances for rest of today  You may not drink alcoholic beverages for 12 hours  Avoid making any critical decisions for 24 hours    DIET:  You may resume your normal diet, but some patients find that heavy or large meals may lead to indigestion or vomiting. I suggest a light meal as first food intake. MEDICATIONS:  The use of some over-the-counter pain medication may lead to bleeding after colon biopsies or polyp removal.  Tylenol (also called acetaminophen) is safe to take even if you have had colonoscopy with polyp removal.  Based on the procedure you had today you may not safely take aspirin or aspirin-like products for the next ten (10) days. Remember that Tylenol (also called acetaminophen) is safe to take after colonoscopy even if you have had biopsies or polyps removed. ACTIVITY:  You may resume your normal household activities, but it is recommended that you spend the remainder of the day resting -  avoid any strenuous activity. CALL DR. Pao Morales' OFFICE IF:  Increasing pain, nausea, vomiting  Abdominal distension (swelling)  Significant new or increased bleeding (oral or rectal)  Fever/Chills  Chest pain/shortness of breath                       Additional instructions: We found several polyps but no obvious explanation for your vomiting.   I did discover that the gastric bypass has 'failed' in that the 'regular' part of the stomach is now connected. Whether that is contributing to your symptoms is not clear. I will get a different type of xray test to see. I'll contact you with the polyp results in 10-14 days. It was an honor to be your doctor today. Please let me or my office staff know if you have any feedback about today's procedure. Ila Gibson MD    Colonoscopy saves lives, and can prevent colon cancer. Everyone aged 48 or older needs colonoscopy.   Tell your family and friends: get the test!

## 2022-12-23 NOTE — H&P
Date: 2022 1:30 PM  Patient Name: Angelika Paulino  Account #: [id number]  Gender: Female   (age): 1945 (78)  Provider:    Parag Lopez. Geovanny Connolly MD     Referring Physician:    Mateo Casanova MD  / [address], [address] [place] [address]  [phone] (phone)  [phone] (fax)    [address]  [phone] (phone)  [phone] (fax)    Ayanna Obregon. [facility]  [address] [facility], [address]  [phone] (phone)  [phone] (fax)     Chief Complaint:    Abdominal pain. History of Present Illness:  44-year-old female with history of colon polyps, gastric bypass surgery,And ovarian cancer. She is currently in the care of Dr. Masters for her gynecologic malignancy. Of late she is complaining of significant crampy abdominal pain after meals associated with excess gas and bloating. Imaging has revealed on separate occasions a questionable J-J intussusception. There is comment of an apparent narrowing of the terminal ileum. There is comment of a mild amount of ascites. On PET images there is mild to moderate uptake at her JJ anastomosis. Her initial gynecologic surgery included total hysterectomy and removal of omental caking. Of late her CA-125 levels are rising. I suspect we are dealing with some sort of mechanical process at her JJ anastomosis. Regrettably, it is unlikely I am going to be able to reach that portion of her small intestine with traditional endoscopes but we will try. Given the finding of narrow terminal ileum I will recommend repeat colonoscopy even though this was performed in 2021 with findings revealing only early tubular adenomas. I do not believe I intubated the terminal ileum on that exam as it was a simple polyp surveillance procedure at that time.     In regards to the ascites noted on CT scan, we could consider aspiration for cytology to determine if there are malignant cells but based on her clinical history I'm not sure this would add much to the overall management. If Dr. James Weiner feels differently I would be happy to order that study to be done. I have a very low index of suspicion that the ascites is due to liver disease is all imaging studies that I reviewed today reveal a normal liver morphology without parenchymal changes, and there are no changes suggestive of portal hypertension on the scans. If I am unable to reach the anastomosis and there are no findings on endoscopy and colonoscopy we will entertain wireless video capsule enteroscopy for a luminal examination but this study would be of significant risk for capsule impaction if there is an area of luminal restriction. This, in turn, might lead to the need for urgent surgery to retrieve the capsule and resolving obstruction from it. Clearly, although I discussed these risks with her today, we will need to rediscuss the prior to ordering any pill camera study. Past Medical History  Medical Conditions:   Arthritis  Asthma  High blood pressure  Mitral Valve Prolapse/MR  Neuropathy  Other heart problems  Surgical Procedures:   D and C, 1984  Hysterectomy, partial  Gastric By-Pass, 1984 & 1986  Dx Studies:   CAT Scan  Colonoscopy, 07/30/2021  Colonoscopy  Endoscopy  Medications: Active FE 75 mg iron- 1,250 mcg  gabapentin 600 mg Take tablet by mouth three times a day  hydrocodone-acetaminophen 5-325 mg  Lynparza 150 mg  magnesium 250 mg  ondansetron HCl 8 mg  promethazine 12.5 mg  tramadol 50 mg  Allergies:   codeine  Erythromycin  morphine (PF)  Percocet  Z Jason  Immunizations:   Flu vaccine, 2012  COVID Vaccine  Pneumococcal conjugate PCV 13  Social History  Alcohol:   Wine 1-2 occassionally. Hard Liquor 1-2. Tobacco:   Never smoker  Drugs:   None  Exercise: Other: ___________.   Caffeine:   None  Marital Status:         Occupation:    self employed     Family History   No history of Colon Cancer, Colon Polyps, Esophogeal Cancer, GI Cancers, IBD (Crohn's or UC), Liver disease  Brother: Diagnosed with Liver disease; Mother:  Review of Systems:  Cardiovascular: Denies chest pain, irregular heart beat, palpitations, peripheral edema, syncope, Sweats. Constitutional: Denies fatigue, fever, loss of appetite, weight loss. ENMT: Denies nose bleeds, sore throat. Endocrine: Denies excessive thirst, heat intolerance. Gastrointestinal: Presents suffers from abdominal pain, constipation. Denies abdominal swelling, change in bowel habits, diarrhea, Bloating/gas, heartburn, jaundice, nausea, rectal bleeding, stomach cramps, vomiting, dysphagia, rectal pain, Stool incontinence, hematemesis. Genitourinary: Denies dark urine, dysuria, frequent urination, hematuria, incontinence. Hematologic/Lymphatic: Denies easy bruising, prolonged bleeding. Integumentary: Denies itching, rashes, sun sensitivity. Musculoskeletal: Denies arthritis, back pain, gout, joint pain, muscle weakness, stiffness. Neurological: Denies dizziness, fainting, frequent headaches, memory loss. Psychiatric: Denies anxiety, depression, difficulty sleeping, hallucinations, nervousness, panic attacks, paranoia. Respiratory: Denies cough, shortness of breath with exercise, wheezing. Vital Signs:  BP  (mmHg)  Pulse  (bpm) Weight (lbs/oz) Height (ft/in) BMI Temp  122/75 93 163 / 5 / 6 26.31 97.3 (F)  Physical Exam:  Constitutional:  Appearance: No distress, appears comfortable. Communication: Understands/receives spoken information. Skin:  Inspection: No rash, no jaundice. Head/face: Inspection: Normacephalic, atraumatic. Eyes:  Conjunctivae/lids: Normal.  Pupils/irises: Pupils equal, round and normal.  ENMT:  External: Normal.  Hearing: Normal.  Neck:  Neck: Normal appearance, trachea midline. Jugular veins: No JVD noted. Respiratory:  Effort: Normal respiratory effort, comfortable, speaks in complete sentences.   Lab Results:   No Electronic Results  Impressions:   Abdominal pain at multiple sites  Abnormal findings on diagnostic imaging of other parts of digestive tract  Plan:   Colonoscopy  Upper Endoscopy  Risk & Medical Necessity:    The level of medical decision making for this visit is moderate. The number and complexity of problems addressed are moderate. The amount and/or complexity of data to be reviewed and analyzed is moderate. The risk of complications and/or morbidity or mortality of patient management is moderate. Odilia Perez. Cindy Charles MD    Electronically signed on 2022 2:32:03 PM by Odilia Perez. Cindy Charles MD  Adi Majors  Daisy Gibson, MRN [id number],  1945 Follow Up, 2022

## 2023-01-04 ENCOUNTER — APPOINTMENT (OUTPATIENT)
Dept: GENERAL RADIOLOGY | Age: 78
End: 2023-01-04
Attending: EMERGENCY MEDICINE
Payer: MEDICARE

## 2023-01-04 ENCOUNTER — HOSPITAL ENCOUNTER (EMERGENCY)
Age: 78
Discharge: HOME | End: 2023-01-04
Attending: EMERGENCY MEDICINE
Payer: MEDICARE

## 2023-01-04 VITALS
HEIGHT: 66 IN | WEIGHT: 163 LBS | TEMPERATURE: 98.5 F | BODY MASS INDEX: 26.2 KG/M2 | SYSTOLIC BLOOD PRESSURE: 128 MMHG | DIASTOLIC BLOOD PRESSURE: 62 MMHG | OXYGEN SATURATION: 98 % | RESPIRATION RATE: 16 BRPM | HEART RATE: 75 BPM

## 2023-01-04 DIAGNOSIS — M54.31 SCIATICA OF RIGHT SIDE: Primary | ICD-10-CM

## 2023-01-04 PROCEDURE — 96372 THER/PROPH/DIAG INJ SC/IM: CPT

## 2023-01-04 PROCEDURE — 72100 X-RAY EXAM L-S SPINE 2/3 VWS: CPT

## 2023-01-04 PROCEDURE — 74011250636 HC RX REV CODE- 250/636: Performed by: EMERGENCY MEDICINE

## 2023-01-04 PROCEDURE — 74011000250 HC RX REV CODE- 250: Performed by: EMERGENCY MEDICINE

## 2023-01-04 PROCEDURE — 99284 EMERGENCY DEPT VISIT MOD MDM: CPT

## 2023-01-04 RX ORDER — LIDOCAINE 4 G/100G
1 PATCH TOPICAL EVERY 24 HOURS
Status: DISCONTINUED | OUTPATIENT
Start: 2023-01-04 | End: 2023-01-05 | Stop reason: HOSPADM

## 2023-01-04 RX ORDER — KETOROLAC TROMETHAMINE 30 MG/ML
30 INJECTION, SOLUTION INTRAMUSCULAR; INTRAVENOUS ONCE
Status: COMPLETED | OUTPATIENT
Start: 2023-01-04 | End: 2023-01-04

## 2023-01-04 RX ORDER — NAPROXEN 500 MG/1
500 TABLET ORAL 2 TIMES DAILY WITH MEALS
Qty: 10 TABLET | Refills: 0 | Status: SHIPPED | OUTPATIENT
Start: 2023-01-04 | End: 2023-01-09

## 2023-01-04 RX ADMIN — KETOROLAC TROMETHAMINE 30 MG: 30 INJECTION, SOLUTION INTRAMUSCULAR; INTRAVENOUS at 22:56

## 2023-01-05 NOTE — ED PROVIDER NOTES
Patient is a 68-year-old female with history of asthma, ovarian cancer currently undergoing therapy, mitral valve prolapse who presents with acute exacerbation of her right-sided sciatica pain. Patient reports that 2 years ago when she had a flareup she needed Toradol that was administered in urgent care that made her feel improved. Complains of pain starting in her right buttock and rating down her leg. Denies any numbness, weakness. No saddle anesthesia. No urinary incontinence or retention.        Past Medical History:   Diagnosis Date    Asthma     Cancer ([facility] [address]     peritoneal cavity, Ovarian, fallopian, apendix    Cardiac rhythm disorder or disturbance or change     MVP and heart murmur, BBB    Left upper quadrant pain 2013    Mitral valve prolapse     Dr. Sri Moreno states this is not showing up currently       Past Surgical History:   Procedure Laterality Date    COLONOSCOPY N/A 2018    COLONOSCOPY performed by Cedric Thomas MD at [address] N/A 2021    COLONOSCOPY (request con-sed) performed by Dajuan Simpson MD at [address] N/A 2022    COLONOSCOPY performed by Dajuan Simpson MD at [address] [facility] [address] GASTRIC BYPASS  [id number]    HX GYN      uterine fibroids removed before hysterectomy    HX HEART CATHETERIZATION      negative    HX ORTHOPAEDIC      total knee replacement    HX OTHER SURGICAL      left middle finger cut nerve and repaired    HX PARTIAL HYSTERECTOMY      HX VASCULAR ACCESS      right chest portacath         Family History:   Problem Relation Age of Onset    Cancer Mother     Heart Disease Mother     Hypertension Mother     Breast Cancer Mother     Cancer Father         leukemia    Cancer Sister         -breast    Breast Cancer Sister     Thyroid Disease Sister         precancer       Social History     Socioeconomic History    Marital status:      Spouse name: Not on file    Number of children: Not on file    Years of education: Not on file    Highest education level: Not on file   Occupational History    Not on file   Tobacco Use    Smoking status: Former     Packs/day: 1.00     Types: Cigarettes     Quit date: 1987     Years since quittin.8    Smokeless tobacco: Never   Vaping Use    Vaping Use: Never used   Substance and Sexual Activity    Alcohol use: Not Currently    Drug use: Not Currently    Sexual activity: Not on file   Other Topics Concern    Not on file   Social History Narrative    Not on file     Social Determinants of Health     Financial Resource Strain: Not on file   Food Insecurity: Not on file   Transportation Needs: Not on file   Physical Activity: Not on file   Stress: Not on file   Social Connections: Not on file   Intimate Partner Violence: Not on file   Housing Stability: Not on file         ALLERGIES: Augmentin [amoxicillin-pot clavulanate], Azithromycin, Codeine, Morphine, Other medication, and Percocet [oxycodone-acetaminophen]    Review of Systems   Constitutional:  Negative for chills and fever. HENT:  Negative for drooling and nosebleeds. Eyes:  Negative for pain and itching. Respiratory:  Negative for choking and stridor. Cardiovascular:  Negative for leg swelling. Gastrointestinal:  Negative for abdominal distention and rectal pain. Endocrine: Negative for heat intolerance and polyphagia. Genitourinary:  Negative for enuresis and genital sores. Musculoskeletal:  Negative for arthralgias and joint swelling. Skin:  Negative for color change. Allergic/Immunologic: Negative for immunocompromised state. Neurological:  Negative for tremors, speech difficulty, weakness and numbness. Hematological:  Negative for adenopathy. Psychiatric/Behavioral:  Negative for dysphoric mood and sleep disturbance.       Vitals:    23   BP: 130/64   Pulse: 73   Resp: 16   Temp: 98.3 °F (36.8 °C)   SpO2: 97%   Weight: 73.9 kg (163 lb)   Height: 5' 5.5\" (1.664 m)            Physical Exam  Vitals and nursing note reviewed. Constitutional:       General: She is not in acute distress. Appearance: She is well-developed. She is not ill-appearing, toxic-appearing or diaphoretic. HENT:      Head: Normocephalic and atraumatic. Nose: Nose normal.   Eyes:      Conjunctiva/sclera: Conjunctivae normal.   Cardiovascular:      Rate and Rhythm: Normal rate and regular rhythm. Heart sounds: Normal heart sounds. Pulmonary:      Effort: Pulmonary effort is normal. No respiratory distress. Abdominal:      General: There is no distension. Palpations: Abdomen is soft. Comments: TTP over right SI joint. Musculoskeletal:         General: No deformity. Normal range of motion. Cervical back: Normal range of motion and neck supple. Skin:     General: Skin is warm and dry. Neurological:      Mental Status: She is alert. Sensory: No sensory deficit. Motor: No weakness. Coordination: Coordination normal.      Gait: Gait normal.   Psychiatric:         Behavior: Behavior normal.        Medical Decision Making  No concern for cauda equina syndrome today. Patient requesting Toradol which was administered in the ED. We will also send her home on short course of anti-inflammatories for relief. She is stable for discharge. Amount and/or Complexity of Data Reviewed  Radiology: ordered. Risk  OTC drugs. Prescription drug management. Procedures    Patient's results have been reviewed with them. Patient and/or family have verbally conveyed their understanding and agreement of the patient's signs, symptoms, diagnosis, treatment and prognosis and additionally agree to follow up as recommended or return to the Emergency Room should their condition change prior to follow-up.   Discharge instructions have also been provided to the patient with some educational information regarding their diagnosis as well a list of reasons why they would want to return to the ER prior to their follow-up appointment should their condition change.

## 2023-01-05 NOTE — ED TRIAGE NOTES
Pt ambulates to treatment area with slight limp. Pt states that this pain started bothering her a week ago and has been progressively getting worse.   Pt states she has dealt with this in the past.

## (undated) DEVICE — Device

## (undated) DEVICE — SOLIDIFIER MEDC 1200ML -- CONVERT TO 356117

## (undated) DEVICE — SNARE ENDOSCP M L240CM W27MM SHTH DIA2.4MM CHN 2.8MM OVL

## (undated) DEVICE — CUFF RMFG BP INF SZ 11 DISP -- LAWSON OEM ITEM 238915

## (undated) DEVICE — 1200 GUARD II KIT W/5MM TUBE W/O VAC TUBE: Brand: GUARDIAN

## (undated) DEVICE — CONTAINER SPEC 20 ML LID NEUT BUFF FORMALIN 10 % POLYPR STS

## (undated) DEVICE — SET GRAV CK VLV NEEDLESS ST 3 GANGED 4WAY STPCOCK HI FLO 10

## (undated) DEVICE — BAG BELONG PT PERS CLEAR HANDL

## (undated) DEVICE — 3M™ CUROS™ DISINFECTING CAP FOR NEEDLELESS CONNECTORS 270/CARTON 20 CARTONS/CASE CFF1-270: Brand: CUROS™

## (undated) DEVICE — SIMPLICITY FLUFF UNDERPAD 23X36, MODERATE: Brand: SIMPLICITY

## (undated) DEVICE — POLYP TRAP: Brand: TRAPEASE®

## (undated) DEVICE — ADULT SPO2 SENSOR: Brand: NELLCOR

## (undated) DEVICE — CANNULA CUSH AD W/ 14FT TBG

## (undated) DEVICE — KENDALL RADIOLUCENT FOAM MONITORING ELECTRODE -RECTANGULAR SHAPE: Brand: KENDALL

## (undated) DEVICE — ELECTRODE,RADIOTRANSLUCENT,FOAM,3PK: Brand: MEDLINE

## (undated) DEVICE — BITEBLOCK ENDOSCP 60FR MAXI WHT POLYETH STURDY W/ VELC WVN

## (undated) DEVICE — KIT COLON W/ 1.1OZ LUB AND 2 END

## (undated) DEVICE — (D)SENSOR RMFG 02 PULS OXMTR -- DISC BY MFR USE ITEM 133445

## (undated) DEVICE — REM POLYHESIVE ADULT PATIENT RETURN ELECTRODE: Brand: VALLEYLAB

## (undated) DEVICE — SOLIDIFIER FLD 2OZ 1500CC N DISINF IN BTL DISP SAFESORB

## (undated) DEVICE — CATH IV AUTOGRD BC PNK 20GA 25 -- INSYTE

## (undated) DEVICE — BAG SPEC BIOHZRD 10 X 10 IN --

## (undated) DEVICE — CANN NASAL O2 CAPNOGRAPHY AD -- FILTERLINE

## (undated) DEVICE — TUBING ADMIN SET INTRAV ARTERI -- CONVERT TO ITEM 340436

## (undated) DEVICE — TRAP SUC MUCOUS 70ML -- MEDICHOICE MEDLINE